# Patient Record
Sex: MALE | Race: WHITE | NOT HISPANIC OR LATINO | Employment: OTHER | URBAN - METROPOLITAN AREA
[De-identification: names, ages, dates, MRNs, and addresses within clinical notes are randomized per-mention and may not be internally consistent; named-entity substitution may affect disease eponyms.]

---

## 2017-01-25 ENCOUNTER — OFFICE VISIT - HEALTHEAST (OUTPATIENT)
Dept: FAMILY MEDICINE | Facility: CLINIC | Age: 67
End: 2017-01-25

## 2017-01-25 DIAGNOSIS — Z00.00 HEALTHCARE MAINTENANCE: ICD-10-CM

## 2017-01-25 DIAGNOSIS — M54.12 CERVICAL RADICULOPATHY AT C6: ICD-10-CM

## 2017-01-25 LAB
CREAT SERPL-MCNC: 0.8 MG/DL (ref 0.7–1.3)
GFR SERPL CREATININE-BSD FRML MDRD: >60 ML/MIN/1.73M2

## 2017-01-25 ASSESSMENT — MIFFLIN-ST. JEOR: SCORE: 1297.42

## 2017-01-30 ENCOUNTER — RECORDS - HEALTHEAST (OUTPATIENT)
Dept: ADMINISTRATIVE | Facility: OTHER | Age: 67
End: 2017-01-30

## 2017-02-02 ENCOUNTER — HOSPITAL ENCOUNTER (OUTPATIENT)
Dept: PHYSICAL MEDICINE AND REHAB | Facility: CLINIC | Age: 67
Discharge: HOME | End: 2017-02-02
Attending: FAMILY MEDICINE

## 2017-02-02 DIAGNOSIS — M54.12 CERVICAL RADICULAR PAIN: ICD-10-CM

## 2017-02-02 DIAGNOSIS — M48.02 NEURAL FORAMINAL STENOSIS OF CERVICAL SPINE: ICD-10-CM

## 2017-02-02 DIAGNOSIS — M79.18 MYOFASCIAL PAIN: ICD-10-CM

## 2017-02-02 ASSESSMENT — MIFFLIN-ST. JEOR: SCORE: 1292.89

## 2017-02-06 ENCOUNTER — HOSPITAL ENCOUNTER (OUTPATIENT)
Dept: PHYSICAL MEDICINE AND REHAB | Facility: CLINIC | Age: 67
Discharge: HOME | End: 2017-02-06
Attending: PHYSICAL MEDICINE & REHABILITATION

## 2017-02-06 DIAGNOSIS — M54.12 CERVICAL RADICULAR PAIN: ICD-10-CM

## 2017-02-06 DIAGNOSIS — M48.02 NEURAL FORAMINAL STENOSIS OF CERVICAL SPINE: ICD-10-CM

## 2017-02-06 DIAGNOSIS — M99.81 OTHER BIOMECHANICAL LESIONS OF CERVICAL REGION: ICD-10-CM

## 2017-02-20 ENCOUNTER — HOSPITAL ENCOUNTER (OUTPATIENT)
Dept: PHYSICAL MEDICINE AND REHAB | Facility: CLINIC | Age: 67
Discharge: HOME | End: 2017-02-20
Attending: PHYSICAL MEDICINE & REHABILITATION

## 2017-02-20 DIAGNOSIS — M54.12 CERVICAL RADICULAR PAIN: ICD-10-CM

## 2017-02-20 DIAGNOSIS — M79.18 MYOFASCIAL PAIN: ICD-10-CM

## 2017-02-20 DIAGNOSIS — M48.02 NEURAL FORAMINAL STENOSIS OF CERVICAL SPINE: ICD-10-CM

## 2017-02-20 ASSESSMENT — MIFFLIN-ST. JEOR: SCORE: 1292.89

## 2017-02-21 ENCOUNTER — OFFICE VISIT - HEALTHEAST (OUTPATIENT)
Dept: PHYSICAL THERAPY | Facility: REHABILITATION | Age: 67
End: 2017-02-21

## 2017-02-21 DIAGNOSIS — M54.12 CERVICAL RADICULOPATHY: ICD-10-CM

## 2017-02-21 DIAGNOSIS — R29.3 POOR POSTURE: ICD-10-CM

## 2017-02-21 DIAGNOSIS — R29.898 DECREASED ROM OF NECK: ICD-10-CM

## 2017-02-21 DIAGNOSIS — M62.81 GENERALIZED MUSCLE WEAKNESS: ICD-10-CM

## 2017-02-23 ENCOUNTER — OFFICE VISIT - HEALTHEAST (OUTPATIENT)
Dept: PHYSICAL THERAPY | Facility: REHABILITATION | Age: 67
End: 2017-02-23

## 2017-02-23 DIAGNOSIS — R29.898 DECREASED ROM OF NECK: ICD-10-CM

## 2017-02-23 DIAGNOSIS — R29.3 POOR POSTURE: ICD-10-CM

## 2017-02-23 DIAGNOSIS — M54.2 ACUTE NECK PAIN: ICD-10-CM

## 2017-02-23 DIAGNOSIS — M62.81 GENERALIZED MUSCLE WEAKNESS: ICD-10-CM

## 2017-02-23 DIAGNOSIS — M54.12 CERVICAL RADICULOPATHY: ICD-10-CM

## 2017-02-28 ENCOUNTER — OFFICE VISIT - HEALTHEAST (OUTPATIENT)
Dept: PHYSICAL THERAPY | Facility: REHABILITATION | Age: 67
End: 2017-02-28

## 2017-02-28 DIAGNOSIS — M54.12 CERVICAL RADICULOPATHY: ICD-10-CM

## 2017-02-28 DIAGNOSIS — R29.3 POOR POSTURE: ICD-10-CM

## 2017-02-28 DIAGNOSIS — M62.81 GENERALIZED MUSCLE WEAKNESS: ICD-10-CM

## 2017-02-28 DIAGNOSIS — R29.898 DECREASED ROM OF NECK: ICD-10-CM

## 2017-03-02 ENCOUNTER — COMMUNICATION - HEALTHEAST (OUTPATIENT)
Dept: FAMILY MEDICINE | Facility: CLINIC | Age: 67
End: 2017-03-02

## 2017-03-02 ENCOUNTER — OFFICE VISIT - HEALTHEAST (OUTPATIENT)
Dept: PHYSICAL THERAPY | Facility: REHABILITATION | Age: 67
End: 2017-03-02

## 2017-03-02 DIAGNOSIS — M62.81 GENERALIZED MUSCLE WEAKNESS: ICD-10-CM

## 2017-03-02 DIAGNOSIS — M54.12 CERVICAL RADICULOPATHY: ICD-10-CM

## 2017-03-02 DIAGNOSIS — R29.898 DECREASED ROM OF NECK: ICD-10-CM

## 2017-03-02 DIAGNOSIS — N52.9 ERECTILE DYSFUNCTION: ICD-10-CM

## 2017-03-02 DIAGNOSIS — R29.3 POOR POSTURE: ICD-10-CM

## 2017-03-14 ENCOUNTER — OFFICE VISIT - HEALTHEAST (OUTPATIENT)
Dept: PHYSICAL THERAPY | Facility: REHABILITATION | Age: 67
End: 2017-03-14

## 2017-03-14 DIAGNOSIS — M62.81 GENERALIZED MUSCLE WEAKNESS: ICD-10-CM

## 2017-03-14 DIAGNOSIS — M54.2 ACUTE NECK PAIN: ICD-10-CM

## 2017-03-14 DIAGNOSIS — R29.3 POOR POSTURE: ICD-10-CM

## 2017-03-14 DIAGNOSIS — M54.12 CERVICAL RADICULOPATHY: ICD-10-CM

## 2017-03-14 DIAGNOSIS — R29.898 DECREASED ROM OF NECK: ICD-10-CM

## 2017-09-25 ENCOUNTER — RECORDS - HEALTHEAST (OUTPATIENT)
Dept: ADMINISTRATIVE | Facility: OTHER | Age: 67
End: 2017-09-25

## 2018-04-26 ENCOUNTER — COMMUNICATION - HEALTHEAST (OUTPATIENT)
Dept: FAMILY MEDICINE | Facility: CLINIC | Age: 68
End: 2018-04-26

## 2018-04-26 ENCOUNTER — OFFICE VISIT - HEALTHEAST (OUTPATIENT)
Dept: FAMILY MEDICINE | Facility: CLINIC | Age: 68
End: 2018-04-26

## 2018-04-26 ENCOUNTER — COMMUNICATION - HEALTHEAST (OUTPATIENT)
Dept: TELEHEALTH | Facility: CLINIC | Age: 68
End: 2018-04-26

## 2018-04-26 DIAGNOSIS — D12.6 ADENOMATOUS POLYP OF COLON: ICD-10-CM

## 2018-04-26 DIAGNOSIS — N52.9 ERECTILE DYSFUNCTION: ICD-10-CM

## 2018-04-26 DIAGNOSIS — K40.20 NON-RECURRENT BILATERAL INGUINAL HERNIA WITHOUT OBSTRUCTION OR GANGRENE: ICD-10-CM

## 2018-04-26 DIAGNOSIS — J45.20 MILD INTERMITTENT ASTHMA: ICD-10-CM

## 2018-04-26 DIAGNOSIS — Z00.00 ROUTINE GENERAL MEDICAL EXAMINATION AT A HEALTH CARE FACILITY: ICD-10-CM

## 2018-04-26 DIAGNOSIS — N40.0 BPH (BENIGN PROSTATIC HYPERPLASIA): ICD-10-CM

## 2018-04-26 DIAGNOSIS — L57.0 ACTINIC KERATOSES: ICD-10-CM

## 2018-04-26 DIAGNOSIS — Z00.00 HEALTHCARE MAINTENANCE: ICD-10-CM

## 2018-04-26 LAB
CHOLEST SERPL-MCNC: 185 MG/DL
FASTING STATUS PATIENT QL REPORTED: YES
FASTING STATUS PATIENT QL REPORTED: YES
GLUCOSE BLD-MCNC: 91 MG/DL (ref 70–99)
HDLC SERPL-MCNC: 78 MG/DL
LDLC SERPL CALC-MCNC: 97 MG/DL
TRIGL SERPL-MCNC: 50 MG/DL

## 2018-04-26 ASSESSMENT — MIFFLIN-ST. JEOR: SCORE: 1296.58

## 2018-04-26 NOTE — ASSESSMENT & PLAN NOTE
Asthma is currently well controlled  ACT= 23  Comment: Uses controller medicine, Flovent, only during allergy season, 1 puff twice daily    Current medications  Rescue: Albuterol:   Primary Controller: Flovent 44  Long Acting Bronchodilator?  No  Other meds?  No    Pneumovax/ PCV 13 UTD?  Given  Consider Flu vaccine if relevant: Gets this every year    Plan: No change

## 2018-04-26 NOTE — ASSESSMENT & PLAN NOTE
Right hemiscrotum with probable varicocele plus laxity of the inguinal ring, likely hernia, some laxity in the left side 2, less.  Discussed options, elected to follow-up with surgeon for consultation

## 2018-04-26 NOTE — ASSESSMENT & PLAN NOTE
Healthcare maintenance assessment  Immunization- PCV 13 given, up-to-date  Cancer screening-discussion and declined PSA, up-to-date colon cancer screening  Vascular-blood pressure good, exercises regularly, lipid recheck  Other-none

## 2018-04-26 NOTE — ASSESSMENT & PLAN NOTE
Actinic Keratosis treatment    History noted on exam today, no complaints from patient    5Keratosis(s) were treated with liquid nitrogen.    Location(s): Both temples and left forehead  Appearance small erythematous and scaly  Cotton tipped applicator used to make frost ring on AKs and this wasmaintained for 1 minute   Was patient asked to follow up for another treatment in 2-3 weeks?  No

## 2018-04-27 ENCOUNTER — OFFICE VISIT - HEALTHEAST (OUTPATIENT)
Dept: SURGERY | Facility: CLINIC | Age: 68
End: 2018-04-27

## 2018-04-27 DIAGNOSIS — K40.20 NON-RECURRENT BILATERAL INGUINAL HERNIA WITHOUT OBSTRUCTION OR GANGRENE: ICD-10-CM

## 2018-04-27 ASSESSMENT — MIFFLIN-ST. JEOR: SCORE: 1295.16

## 2018-05-03 ENCOUNTER — COMMUNICATION - HEALTHEAST (OUTPATIENT)
Dept: SURGERY | Facility: CLINIC | Age: 68
End: 2018-05-03

## 2018-05-24 ENCOUNTER — RECORDS - HEALTHEAST (OUTPATIENT)
Dept: ADMINISTRATIVE | Facility: OTHER | Age: 68
End: 2018-05-24

## 2018-08-15 ENCOUNTER — RECORDS - HEALTHEAST (OUTPATIENT)
Dept: ADMINISTRATIVE | Facility: OTHER | Age: 68
End: 2018-08-15

## 2018-10-09 ENCOUNTER — RECORDS - HEALTHEAST (OUTPATIENT)
Dept: ADMINISTRATIVE | Facility: OTHER | Age: 68
End: 2018-10-09

## 2019-02-19 ENCOUNTER — OFFICE VISIT - HEALTHEAST (OUTPATIENT)
Dept: FAMILY MEDICINE | Facility: CLINIC | Age: 69
End: 2019-02-19

## 2019-02-19 DIAGNOSIS — L01.00 IMPETIGO: ICD-10-CM

## 2019-11-05 ENCOUNTER — COMMUNICATION - HEALTHEAST (OUTPATIENT)
Dept: FAMILY MEDICINE | Facility: CLINIC | Age: 69
End: 2019-11-05

## 2019-11-05 ENCOUNTER — AMBULATORY - HEALTHEAST (OUTPATIENT)
Dept: FAMILY MEDICINE | Facility: CLINIC | Age: 69
End: 2019-11-05

## 2019-11-05 DIAGNOSIS — N52.9 ERECTILE DYSFUNCTION, UNSPECIFIED ERECTILE DYSFUNCTION TYPE: ICD-10-CM

## 2019-11-06 ENCOUNTER — AMBULATORY - HEALTHEAST (OUTPATIENT)
Dept: NURSING | Facility: CLINIC | Age: 69
End: 2019-11-06

## 2019-11-06 DIAGNOSIS — Z23 FLU VACCINE NEED: ICD-10-CM

## 2019-11-16 ENCOUNTER — OFFICE VISIT - HEALTHEAST (OUTPATIENT)
Dept: FAMILY MEDICINE | Facility: CLINIC | Age: 69
End: 2019-11-16

## 2019-11-16 DIAGNOSIS — J45.21 MILD INTERMITTENT ASTHMA WITH ACUTE EXACERBATION: ICD-10-CM

## 2019-11-16 DIAGNOSIS — J06.9 VIRAL URI: ICD-10-CM

## 2019-11-18 ENCOUNTER — COMMUNICATION - HEALTHEAST (OUTPATIENT)
Dept: FAMILY MEDICINE | Facility: CLINIC | Age: 69
End: 2019-11-18

## 2020-01-06 ENCOUNTER — OFFICE VISIT - HEALTHEAST (OUTPATIENT)
Dept: SURGERY | Facility: CLINIC | Age: 70
End: 2020-01-06

## 2020-01-06 ENCOUNTER — SURGERY - HEALTHEAST (OUTPATIENT)
Dept: SURGERY | Facility: CLINIC | Age: 70
End: 2020-01-06

## 2020-01-06 DIAGNOSIS — K40.20 BILATERAL INGUINAL HERNIA: ICD-10-CM

## 2020-01-06 DIAGNOSIS — K40.20 NON-RECURRENT BILATERAL INGUINAL HERNIA WITHOUT OBSTRUCTION OR GANGRENE: ICD-10-CM

## 2020-01-06 RX ORDER — MULTIVIT WITH MINERALS/LUTEIN
1000 TABLET ORAL DAILY
Status: SHIPPED | COMMUNITY
Start: 2020-01-06

## 2020-01-06 RX ORDER — MULTIPLE VITAMINS W/ MINERALS TAB 9MG-400MCG
1 TAB ORAL DAILY
Status: SHIPPED | COMMUNITY
Start: 2020-01-06

## 2020-01-21 ENCOUNTER — OFFICE VISIT - HEALTHEAST (OUTPATIENT)
Dept: FAMILY MEDICINE | Facility: CLINIC | Age: 70
End: 2020-01-21

## 2020-01-21 DIAGNOSIS — Z01.818 PRE-OP EXAM: ICD-10-CM

## 2020-01-21 DIAGNOSIS — K40.20 NON-RECURRENT BILATERAL INGUINAL HERNIA WITHOUT OBSTRUCTION OR GANGRENE: ICD-10-CM

## 2020-01-21 DIAGNOSIS — J45.20 MILD INTERMITTENT ASTHMA WITHOUT COMPLICATION: ICD-10-CM

## 2020-01-21 LAB
ANION GAP SERPL CALCULATED.3IONS-SCNC: 10 MMOL/L (ref 5–18)
ATRIAL RATE - MUSE: 59 BPM
BUN SERPL-MCNC: 15 MG/DL (ref 8–22)
CALCIUM SERPL-MCNC: 9.3 MG/DL (ref 8.5–10.5)
CHLORIDE BLD-SCNC: 102 MMOL/L (ref 98–107)
CO2 SERPL-SCNC: 28 MMOL/L (ref 22–31)
CREAT SERPL-MCNC: 0.82 MG/DL (ref 0.7–1.3)
DIASTOLIC BLOOD PRESSURE - MUSE: NORMAL
ERYTHROCYTE [DISTWIDTH] IN BLOOD BY AUTOMATED COUNT: 11.6 % (ref 11–14.5)
GFR SERPL CREATININE-BSD FRML MDRD: >60 ML/MIN/1.73M2
GLUCOSE BLD-MCNC: 82 MG/DL (ref 70–125)
HCT VFR BLD AUTO: 45 % (ref 40–54)
HGB BLD-MCNC: 14.8 G/DL (ref 14–18)
INTERPRETATION ECG - MUSE: NORMAL
MCH RBC QN AUTO: 31.5 PG (ref 27–34)
MCHC RBC AUTO-ENTMCNC: 32.8 G/DL (ref 32–36)
MCV RBC AUTO: 96 FL (ref 80–100)
P AXIS - MUSE: 81 DEGREES
PLATELET # BLD AUTO: 208 THOU/UL (ref 140–440)
PMV BLD AUTO: 9.1 FL (ref 7–10)
POTASSIUM BLD-SCNC: 4.5 MMOL/L (ref 3.5–5)
PR INTERVAL - MUSE: 152 MS
QRS DURATION - MUSE: 90 MS
QT - MUSE: 414 MS
QTC - MUSE: 409 MS
R AXIS - MUSE: 67 DEGREES
RBC # BLD AUTO: 4.68 MILL/UL (ref 4.4–6.2)
SODIUM SERPL-SCNC: 140 MMOL/L (ref 136–145)
SYSTOLIC BLOOD PRESSURE - MUSE: NORMAL
T AXIS - MUSE: 61 DEGREES
VENTRICULAR RATE- MUSE: 59 BPM
WBC: 3.9 THOU/UL (ref 4–11)

## 2020-01-21 ASSESSMENT — MIFFLIN-ST. JEOR: SCORE: 1263.41

## 2020-01-22 ASSESSMENT — MIFFLIN-ST. JEOR: SCORE: 1275.88

## 2020-01-23 ENCOUNTER — ANESTHESIA - HEALTHEAST (OUTPATIENT)
Dept: SURGERY | Facility: AMBULATORY SURGERY CENTER | Age: 70
End: 2020-01-23

## 2020-01-24 ENCOUNTER — SURGERY - HEALTHEAST (OUTPATIENT)
Dept: SURGERY | Facility: AMBULATORY SURGERY CENTER | Age: 70
End: 2020-01-24

## 2020-01-24 ASSESSMENT — MIFFLIN-ST. JEOR: SCORE: 1275.88

## 2020-04-29 ENCOUNTER — COMMUNICATION - HEALTHEAST (OUTPATIENT)
Dept: SCHEDULING | Facility: CLINIC | Age: 70
End: 2020-04-29

## 2020-04-29 ENCOUNTER — OFFICE VISIT - HEALTHEAST (OUTPATIENT)
Dept: FAMILY MEDICINE | Facility: CLINIC | Age: 70
End: 2020-04-29

## 2020-04-29 DIAGNOSIS — T14.8XXA BONE BRUISE: ICD-10-CM

## 2020-04-29 ASSESSMENT — MIFFLIN-ST. JEOR: SCORE: 1283.13

## 2020-08-10 ENCOUNTER — OFFICE VISIT - HEALTHEAST (OUTPATIENT)
Dept: FAMILY MEDICINE | Facility: CLINIC | Age: 70
End: 2020-08-10

## 2020-08-10 DIAGNOSIS — H61.22 IMPACTED CERUMEN OF LEFT EAR: ICD-10-CM

## 2020-08-12 ENCOUNTER — COMMUNICATION - HEALTHEAST (OUTPATIENT)
Dept: FAMILY MEDICINE | Facility: CLINIC | Age: 70
End: 2020-08-12

## 2021-05-17 ENCOUNTER — COMMUNICATION - HEALTHEAST (OUTPATIENT)
Dept: FAMILY MEDICINE | Facility: CLINIC | Age: 71
End: 2021-05-17

## 2021-05-17 DIAGNOSIS — N52.9 ERECTILE DYSFUNCTION, UNSPECIFIED ERECTILE DYSFUNCTION TYPE: ICD-10-CM

## 2021-05-18 RX ORDER — SILDENAFIL 25 MG/1
25 TABLET, FILM COATED ORAL DAILY PRN
Qty: 6 TABLET | Refills: 9 | Status: SHIPPED | OUTPATIENT
Start: 2021-05-18 | End: 2023-01-12

## 2021-05-26 ENCOUNTER — RECORDS - HEALTHEAST (OUTPATIENT)
Dept: ADMINISTRATIVE | Facility: CLINIC | Age: 71
End: 2021-05-26

## 2021-05-27 VITALS
SYSTOLIC BLOOD PRESSURE: 135 MMHG | DIASTOLIC BLOOD PRESSURE: 83 MMHG | RESPIRATION RATE: 18 BRPM | TEMPERATURE: 97.7 F | HEART RATE: 60 BPM | OXYGEN SATURATION: 100 %

## 2021-05-28 ENCOUNTER — RECORDS - HEALTHEAST (OUTPATIENT)
Dept: ADMINISTRATIVE | Facility: CLINIC | Age: 71
End: 2021-05-28

## 2021-05-28 ASSESSMENT — ASTHMA QUESTIONNAIRES: ACT_TOTALSCORE: 24

## 2021-05-30 VITALS — HEIGHT: 68 IN | BODY MASS INDEX: 18.64 KG/M2 | WEIGHT: 123 LBS

## 2021-05-30 VITALS — BODY MASS INDEX: 18.49 KG/M2 | WEIGHT: 122 LBS | HEIGHT: 68 IN

## 2021-05-30 VITALS — WEIGHT: 122 LBS | HEIGHT: 68 IN | BODY MASS INDEX: 18.49 KG/M2

## 2021-06-01 VITALS — BODY MASS INDEX: 19.78 KG/M2 | WEIGHT: 126 LBS | HEIGHT: 67 IN

## 2021-06-01 VITALS — WEIGHT: 126.31 LBS | BODY MASS INDEX: 19.82 KG/M2 | HEIGHT: 67 IN

## 2021-06-02 VITALS — BODY MASS INDEX: 20.36 KG/M2 | WEIGHT: 130 LBS

## 2021-06-03 NOTE — PATIENT INSTRUCTIONS - HE
You were seen today for a cough. This is likely due to a virus and will improve over the next 1-2 weeks on its own.    Symptom management:  - Drink plenty of non-caffeinated fluids  - Avoid smoke exposure  - May use tylenol or ibuprofen for discomfort  - Drink a warm non-caffeinated tea with honey  - Place a warm humidifier in your bedroom at night  - Lester's VaporRub  - If prescribed, use Tessalon Perles to help suppress the cough    Reasons to return for re-evaluation:  - Develop a fever 100.4 or higher, current fever worsens, or fever does not improve in 72 hours  - Difficulty breathing or shortness of breath  - Cough continues to worsen including coughing up blood or coughing up thick, colored phlegm  - Unable to tolerate fluids    Otherwise, if symptoms have not improved in 7 days, follow-up with your primary care provider.

## 2021-06-03 NOTE — TELEPHONE ENCOUNTER
Patient was at the [facility] clinic 11/16/2019 forwarding to correct location.

## 2021-06-03 NOTE — TELEPHONE ENCOUNTER
"Medication Request  Medication name:   Sildenafil 25 mg, 8 Tablets  Pharmacy Name and Location:   [facility] in [facility], #[id number]  Reason for request:   ED  When did you use medication last?:    Within the last month.  Patient offered appointment:  No, patient states \"Dr. Nunez just orders it for me.\"  Okay to leave a detailed message: yes    "

## 2021-06-03 NOTE — PROGRESS NOTES
Assessment:       1. Viral URI     2. Mild intermittent asthma with acute exacerbation       Medical Decision Making  Presents with cough for 1 week most consistent with a viral upper respiratory infection.  No concern for pneumonia at this time given normal lung auscultation on exam, no shortness of breath, and good oxygen saturation at 90% on room air.  No signs of asthma exacerbation given no wheezing on exam today.  Patient also notes good improvement in wheezing with his at home albuterol inhaler.      Plan:       Recommended honey and Vicks VapoRub as needed for cough.  Continue to drink plenty of fluids and allow for appropriate rest.  Continue to use at home albuterol inhaler as needed.  Discussed signs of worsening symptoms and when to follow-up with PCP if no symptom improvement.      Patient Instructions   You were seen today for a cough. This is likely due to a virus and will improve over the next 1-2 weeks on its own.    Symptom management:  - Drink plenty of non-caffeinated fluids  - Avoid smoke exposure  - May use tylenol or ibuprofen for discomfort  - Drink a warm non-caffeinated tea with honey  - Place a warm humidifier in your bedroom at night  - Lester's VaporRub  - If prescribed, use Tessalon Perles to help suppress the cough    Reasons to return for re-evaluation:  - Develop a fever 100.4 or higher, current fever worsens, or fever does not improve in 72 hours  - Difficulty breathing or shortness of breath  - Cough continues to worsen including coughing up blood or coughing up thick, colored phlegm  - Unable to tolerate fluids    Otherwise, if symptoms have not improved in 7 days, follow-up with your primary care provider.      Subjective:       Terell Sebastian is a 69 y.o. male here for evaluation of.  Onset of symptoms was 1 week ago.  Patient initially noticed a tickle in the throat soon followed by a cough with occasional phlegm production.  Patient initially had subjective fevers, fatigue,  body aches that is since improved.  He states last night the cough worsened he had difficulties sleeping and became more wet sounding.  Patient does have a history of mild intermittent asthma.  He used his albuterol inhaler as needed with good relief.    The following portions of the patient's history were reviewed and updated as appropriate: allergies, current medications and problem list.    Review of Systems  Pertinent items are noted in HPI.     Allergies  No Known Allergies    Family History   Problem Relation Age of Onset     Alzheimer's disease Unknown         Paternal     Breast cancer Unknown         Paternal      Heart disease Father      Asthma Sister      Breast cancer Sister      Asthma Maternal Aunt      Pneumonia Mother        Social History     Socioeconomic History     Marital status: Single     Spouse name: None     Number of children: None     Years of education: None     Highest education level: None   Occupational History     None   Social Needs     Financial resource strain: None     Food insecurity:     Worry: None     Inability: None     Transportation needs:     Medical: None     Non-medical: None   Tobacco Use     Smoking status: Never Smoker     Smokeless tobacco: Never Used   Substance and Sexual Activity     Alcohol use: Yes     Drug use: No     Sexual activity: None   Lifestyle     Physical activity:     Days per week: None     Minutes per session: None     Stress: None   Relationships     Social connections:     Talks on phone: None     Gets together: None     Attends Tenriism service: None     Active member of club or organization: None     Attends meetings of clubs or organizations: None     Relationship status: None     Intimate partner violence:     Fear of current or ex partner: None     Emotionally abused: None     Physically abused: None     Forced sexual activity: None   Other Topics Concern     None   Social History Narrative    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          Objective:       /80   Pulse 77   Temp 98  F (36.7  C) (Oral)   Resp 12   Wt 124 lb 4.8 oz (56.4 kg)   SpO2 98%   BMI 19.47 kg/m    General appearance: alert, appears stated age, cooperative, no distress and non-toxic  Head: Normocephalic, without obvious abnormality, atraumatic, sinuses nontender to percussion  Ears: normal TM's and external ear canals both ears  Nose: no discharge  Throat: lips, mucosa, and tongue normal; teeth and gums normal  Neck: no adenopathy and supple, symmetrical, trachea midline  Lungs: clear to auscultation bilaterally and No rhonchi, rales, or wheezing  Heart: regular rate and rhythm, S1, S2 normal, no murmur, click, rub or gallop

## 2021-06-03 NOTE — TELEPHONE ENCOUNTER
Who is calling:  Patient   Reason for Call:  Patient states when he came for office visit on 11/16/19 he forgot his [employer] electronic reader the case is light brown color if clinic staff finds please call patient cell phone number [phone] and inform.   Date of last appointment with primary care: 311/16/19  Okay to leave a detailed message: No

## 2021-06-03 NOTE — TELEPHONE ENCOUNTER
Called and informed pt that the nook is not in clinic      Pt will keep looking - wasn't sure where he left it

## 2021-06-04 VITALS — BODY MASS INDEX: 18.19 KG/M2 | HEIGHT: 68 IN | WEIGHT: 120 LBS

## 2021-06-04 VITALS
WEIGHT: 123.8 LBS | OXYGEN SATURATION: 99 % | SYSTOLIC BLOOD PRESSURE: 120 MMHG | BODY MASS INDEX: 19.39 KG/M2 | DIASTOLIC BLOOD PRESSURE: 62 MMHG | HEART RATE: 62 BPM

## 2021-06-04 VITALS
WEIGHT: 121.6 LBS | RESPIRATION RATE: 16 BRPM | DIASTOLIC BLOOD PRESSURE: 82 MMHG | HEIGHT: 68 IN | SYSTOLIC BLOOD PRESSURE: 158 MMHG | TEMPERATURE: 98.5 F | BODY MASS INDEX: 18.43 KG/M2 | HEART RATE: 65 BPM | OXYGEN SATURATION: 99 %

## 2021-06-04 VITALS
RESPIRATION RATE: 20 BRPM | WEIGHT: 119 LBS | BODY MASS INDEX: 18.68 KG/M2 | SYSTOLIC BLOOD PRESSURE: 118 MMHG | HEART RATE: 70 BPM | HEIGHT: 67 IN | DIASTOLIC BLOOD PRESSURE: 68 MMHG

## 2021-06-04 VITALS
HEART RATE: 77 BPM | OXYGEN SATURATION: 98 % | BODY MASS INDEX: 19.47 KG/M2 | TEMPERATURE: 98 F | WEIGHT: 124.3 LBS | DIASTOLIC BLOOD PRESSURE: 80 MMHG | SYSTOLIC BLOOD PRESSURE: 133 MMHG | RESPIRATION RATE: 12 BRPM

## 2021-06-04 NOTE — PROGRESS NOTES
HPI:  This is a 69 y.o. male here today with concerns of pain and bulging in his  bilateral groins area. He has noted this for the past few year(s). The symptoms have progressed and increased over this time. He comes in for evaluation secondary to the hernia causing enough issues to bother them with daily activities or chores.    Allergies:Patient has no known allergies.    History reviewed. No pertinent past medical history.    Past Surgical History:   Procedure Laterality Date     NO PAST SURGERIES         CURRENT MEDS:      Family History   Problem Relation Age of Onset     Alzheimer's disease Other         Paternal     Breast cancer Other         Paternal      Heart disease Father      Asthma Sister      Breast cancer Sister      Asthma Maternal Aunt      Pneumonia Mother         reports that he has never smoked. He has never used smokeless tobacco. He reports current alcohol use. He reports that he does not use drugs.    Review of Systems - Negative except bilateral groin bulges and discomfort and pain. Otherwise twelve system of review is negative.      Vitals:    01/06/20 0859   BP: 120/62   Patient Site: Right Arm   Patient Position: Sitting   Cuff Size: Adult Regular   Pulse: 62   SpO2: 99%   Weight: 123 lb 12.8 oz (56.2 kg)       Body mass index is 19.39 kg/m .    EXAM:  General: NAD   HEENT: normocephalic, PERRLA and EOMS intact  Mounth: Mucus membranes moist  Neck: Supple  Chest: Clear to auscultation bilaterally  CV: RRR  ABD: Soft nontender and nondistended, bilateral inguinal hernia, reducible  EXT: Warm, pulses intact,   Neuro: Alert and oriented x3  Back: no CVA tenderness      Assessment/Plan: Pt with a bilateral inguinal hernia. I discussed this at length with He.  I went over conservative management as well as surgical treatment of this.. I would plan on doing this via anlaparoscopic  approach with possible use of mesh. I went over the small risks of surgery including but not limited to bleeding  and infection, anesthesia, recurrence rates and nerve injury. I discussed the expected recovery time as well. Will get this scheduled. He will contact us to have this scheduled.      Ricardo Valerio MD  [facility] Department of Surgery

## 2021-06-05 NOTE — ANESTHESIA CARE TRANSFER NOTE
Last vitals:   Vitals:    01/24/20 0859   BP: (P) 137/65   Pulse: (P) 71   Resp: (P) 16   Temp: (P) 36.4  C (97.6  F)   SpO2: (P) 100%     Pt brought to PACU on 10L facemask. Monitors applied. VSS upon arrival.    Patient's level of consciousness is drowsy  Spontaneous respirations: yes  Maintains airway independently: yes  Dentition unchanged: yes  Oropharynx: oropharynx clear of all foreign objects    QCDR Measures:  ASA# 20 - Surgical Safety Checklist: WHO surgical safety checklist completed prior to induction    PQRS# 430 - Adult PONV Prevention: 4558F - Pt received => 2 anti-emetic agents (different classes) preop & intraop  ASA# 8 - Peds PONV Prevention: NA - Not pediatric patient, not GA or 2 or more risk factors NOT present  PQRS# 424 - Olga-op Temp Management: 4559F - At least one body temp DOCUMENTED => 35.5C or 95.9F within required timeframe  PQRS# 426 - PACU Transfer Protocol: - Transfer of care checklist used  ASA# 14 - Acute Post-op Pain: ASA14B - Patient did NOT experience pain >= 7 out of 10

## 2021-06-05 NOTE — ANESTHESIA PREPROCEDURE EVALUATION
Anesthesia Evaluation      Patient summary reviewed   No history of anesthetic complications     Airway   Mallampati: II   Pulmonary - normal exam   (+) asthma  mild,                          Cardiovascular - negative ROS and normal exam  Rhythm: regular  Rate: normal,         Neuro/Psych - negative ROS     Endo/Other - negative ROS      GI/Hepatic/Renal - negative ROS           Dental - normal exam                        Anesthesia Plan  Planned anesthetic: general endotracheal  GAETT  Scopolamine for PONV  Antiemetics  Tylenol po pre op  Toradol at the end of case if okay with surgeon  Consider background propofol  Soft bite block  ASA 1   Induction: intravenous   Anesthetic plan and risks discussed with: patient    Post-op plan: routine recovery

## 2021-06-05 NOTE — ANESTHESIA POSTPROCEDURE EVALUATION
Patient: Terell Sebastian  Procedure(s):  Laparoscopic bilateral inguinal hernia repair (Bilateral)  Anesthesia type: general    Patient location: Phase II Recovery  Last vitals:   Vitals Value Taken Time   /80 1/24/2020 10:30 AM   Temp 36.3  C (97.4  F) 1/24/2020  9:23 AM   Pulse 67 1/24/2020 10:37 AM   Resp 16 1/24/2020  9:23 AM   SpO2 98 % 1/24/2020 10:37 AM   Vitals shown include unvalidated device data.  Post vital signs: stable  Level of consciousness: awake and responds to simple questions  Post-anesthesia pain: pain controlled  Post-anesthesia nausea and vomiting: no  Pulmonary: unassisted, return to baseline  Cardiovascular: stable and blood pressure at baseline  Hydration: adequate  Anesthetic events: no    QCDR Measures:  ASA# 11 - Olga-op Cardiac Arrest: ASA11B - Patient did NOT experience unanticipated cardiac arrest  ASA# 12 - Olga-op Mortality Rate: ASA12B - Patient did NOT die  ASA# 13 - PACU Re-Intubation Rate: ASA13B - Patient did NOT require a new airway mgmt  ASA# 10 - Composite Anes Safety: ASA10A - No serious adverse event    Additional Notes:

## 2021-06-05 NOTE — PROGRESS NOTES
Assessment/Plan:      Visit for Preoperative Exam.   1. Pre-op exam  2. Non-recurrent bilateral inguinal hernia without obstruction or gangrene  3. Mild intermittent asthma without complication  - Basic Metabolic Panel  - HM2(CBC w/o Differential)  - Electrocardiogram Perform and Read    Past medical history, problem list, past surgical history, family history, social history, medications reviewed, updated, reconciled.   Well appearing.   Asthma well controlled.   No concerns today.   Patient approved for surgery with general or local anesthesia. Postoperative Care will be managed by Hospital Service. Labs will be done as indicated. Above recommendations were reviewed with the patient. No Cardiology consultation or non-invasive testing.     Subjective:     Scheduled Procedure: hernia repair  Surgery Date:  1/24/20   HPI  [age] year old male here for pre op evaluation.   No concerns.   History of well controlled asthma.   Is feeling well. No recent illness. No prior surgery. Influenza vaccine up date.   Current Outpatient Medications   Medication Sig Dispense Refill     albuterol (PROVENTIL HFA;VENTOLIN HFA) 90 mcg/actuation inhaler Inhale 2 puffs every 6 (six) hours as needed for wheezing.       fluticasone (FLOVENT HFA) 44 mcg/actuation inhaler Inhale 1 puff 2 (two) times a day.       multivitamin with minerals (THERA-M) 9 mg iron-400 mcg Tab tablet Take 1 tablet by mouth daily.       sildenafil (VIAGRA) 25 MG tablet Take 1 tablet (25 mg total) by mouth daily as needed for erectile dysfunction. 8 tablet 6     vitamin E 1000 UNIT capsule Take 1,000 Units by mouth daily.       HYDROcodone-acetaminophen 5-325 mg per tablet Take 1-2 tablets by mouth every 6 (six) hours as needed for pain. 15 tablet 0     No current facility-administered medications for this visit.      Facility-Administered Medications Ordered in Other Visits   Medication Dose Route Frequency Provider Last Rate Last Dose     bupivacaine (PF)  "0.25% injection (MARCAINE)    PRN Ricardo Valerio MD   50 mL at 01/24/20 0837     cellulose, oxidized 4x8\" pad (SURGICEL)    PRN Ricardo Valerio MD   1 each at 01/24/20 0832     diphenhydrAMINE injection 12.5 mg (BENADRYL)  12.5 mg Intravenous Q5 Min PRN Bal Lala MD         fentaNYL pf injection 25-50 mcg (SUBLIMAZE)  25-50 mcg Intravenous Q5 Min PRN Bal Lala MD         haloperidol lactate injection 1 mg (HALDOL)  1 mg Intravenous Once PRN Bal Lala MD         HYDROcodone-acetaminophen 5-325 mg per tablet 2 tablet  2 tablet Oral Q4H PRN Bal Lala MD   1 tablet at 01/24/20 1007     lactated Ringers  100 mL/hr Intravenous Continuous PRN Chiquis Saldana MD         meperidine (PF) injection 12.5 mg (DEMEROL)  12.5 mg Intravenous Q5 Min PRN Bal Lala MD         nalbuphine injection 5 mg (NUBAIN)  5 mg Intravenous PRN Bal Lala MD         naloxone injection 0.2-0.4 mg (NARCAN)  0.2-0.4 mg Intravenous PRN Bal Lala MD        Or     naloxone injection 0.2-0.4 mg (NARCAN)  0.2-0.4 mg Intramuscular PRN Bal Lala MD         ondansetron injection 4 mg (ZOFRAN)  4 mg Intravenous Once PRN Bal Lala MD         oxyCODONE-acetaminophen 5-325 mg 2 tablet (PERCOCET/ENDOCET)  2 tablet Oral Q4H PRN Bal Lala MD         scopolamine 1.5 mg transdermal patch 1 patch (TRANSDERM-SCOP)  1 patch Transdermal Once Bal Lala MD   1 patch at 01/24/20 0743     sterile water IR irrigation solution    PRN Ricardo Valerio MD   500 mL at 01/24/20 0823       No Known Allergies    Immunization History   Administered Date(s) Administered     DT (pediatric) 12/09/2004     Influenza [id number], 01/08/2010     Influenza high dose,seasonal,PF, 65+ yrs 09/05/2017, 11/06/2019     Influenza, inj, historic,unspecified 10/26/2007, 10/09/2009, 10/25/2010, 11/18/2011     Influenza, seasonal,quad inj 6-35 mos 10/04/2012     Influenza,seasonal,quad inj =/> 6months " 01/25/2017     Pneumo Conj 13-V (2010&after) 04/26/2018     Pneumo Polysac 23-V 11/18/1997     Td,adult,historic,unspecified 12/09/2004     Tdap 01/25/2017       Patient Active Problem List   Diagnosis     Backache     Allergic Rhinitis     BPH (benign prostatic hyperplasia)     Benign Prostatic Hypertrophy With Urinary Obstruction     Prostatitis     Mild intermittent asthma     Adenomatous polyp of colon     Healthcare maintenance     Non-recurrent bilateral inguinal hernia without obstruction or gangrene     Erectile dysfunction     Actinic keratoses     Bilateral inguinal hernia       Past Medical History:   Diagnosis Date     Asthma      Family History   Problem Relation Age of Onset     Alzheimer's disease Other         Paternal     Breast cancer Other         Paternal      Heart disease Father      Asthma Sister      Breast cancer Sister      Asthma Maternal Aunt      Pneumonia Mother          Social History     Socioeconomic History     Marital status: Single     Spouse name: Not on file     Number of children: Not on file     Years of education: Not on file     Highest education level: Not on file   Occupational History     Not on file   Social Needs     Financial resource strain: Not on file     Food insecurity:     Worry: Not on file     Inability: Not on file     Transportation needs:     Medical: Not on file     Non-medical: Not on file   Tobacco Use     Smoking status: Never Smoker     Smokeless tobacco: Never Used   Substance and Sexual Activity     Alcohol use: Yes     Drug use: No     Sexual activity: Not on file   Lifestyle     Physical activity:     Days per week: Not on file     Minutes per session: Not on file     Stress: Not on file   Relationships     Social connections:     Talks on phone: Not on file     Gets together: Not on file     Attends Lutheran service: Not on file     Active member of club or organization: Not on file     Attends meetings of clubs or organizations: Not on file      "Relationship status: Not on file     Intimate partner violence:     Fear of current or ex partner: Not on file     Emotionally abused: Not on file     Physically abused: Not on file     Forced sexual activity: Not on file   Other Topics Concern     Not on file   Social History Narrative    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 [employer]       Past Surgical History:   Procedure Laterality Date     NO PAST SURGERIES       Recent Health  Fever: no  Chills: no  Fatigue: no  Chest Pain: no  Cough: no  Dyspnea: no  Urinary Frequency: no  Nausea: no  Vomiting: no  Diarrhea: no  Abdominal Pain: no  Easy Bruising: no  Lower Extremity Swelling: no  Poor Exercise Tolerance: no    Most recent Health Maintenance Visit:  1 year(s) ago    Pertinent History  Prior Anesthesia: no  Previous Anesthesia Reaction:  no  Diabetes: no  Cardiovascular Disease: no  Pulmonary Disease: asthma  Renal Disease: no  GI Disease: no  Sleep Apnea: no  Thromboembolic Problems: no  Clotting Disorder: no  Bleeding Disorder: no  Transfusion Reaction: no  Impaired Immunity: no  Steroid use in the last 6 months: no  Frequent Aspirin use: no      Social history of patient does not wear denture or partial plates, there is no transfusion refusal and there are no concerns regarding care after surgery    After surgery, the patient plans to recover at home with family.    Review of Systems  A 12 point comprehensive review of systems was negative except as noted.          Objective:         Vitals:    01/21/20 0827   BP: 118/68   Pulse: 70   Resp: 20   Weight: 119 lb (54 kg)   Height: 5' 7\" (1.702 m)       Physical Exam:  General Appearance: Alert, cooperative, no distress, appears stated age  Head: Normocephalic, without obvious abnormality, atraumatic  Eyes: PERRL, conjunctiva/corneas clear, EOM's intact  Ears: Normal TM's and external ear canals, both ears  Nose: Nares normal, septum midline,mucosa normal, no drainage  Throat: Lips, mucosa, and tongue normal; teeth " and gums normal  Neck: Supple, symmetrical, trachea midline, no adenopathy;  thyroid: not enlarged, symmetric, no tenderness/mass/nodules; no carotid bruit or JVD  Lungs: Clear to auscultation bilaterally, respirations unlabored  Heart: Regular rate and rhythm, S1 and S2 normal, no murmur, rub, or gallop,  Abdomen: Soft, non-tender, bowel sounds active all four quadrants,  no masses, no organomegaly  Genitourinary: defer  Musculoskeletal: Normal range of motion. No joint swelling or deformity.   Extremities: Extremities normal, atraumatic, no cyanosis or edema  Skin: Skin color, texture, turgor normal, no rashes or lesions  Lymph nodes: Cervical, supraclavicular nodes normal  Neurologic: He is alert. He has normal reflexes.   Psychiatric: He has a normal mood and affect.      Recent Results (from the past 240 hour(s))   Electrocardiogram Perform and Read   Result Value Ref Range    SYSTOLIC BLOOD PRESSURE      DIASTOLIC BLOOD PRESSURE      VENTRICULAR RATE 59 BPM    ATRIAL RATE 59 BPM    P-R INTERVAL 152 ms    QRS DURATION 90 ms    Q-T INTERVAL 414 ms    QTC CALCULATION (BEZET) 409 ms    P Axis 81 degrees    R AXIS 67 degrees    T AXIS 61 degrees    MUSE DIAGNOSIS       Sinus bradycardia  Normal ECG  When compared with ECG of 21-MAR-2005 07:04,  No significant change was found  Confirmed by ANGELINA COELHO MD LOC:[facility] ([id number]) on 1/21/2020 4:41:02 PM     Basic Metabolic Panel   Result Value Ref Range    Sodium 140 136 - 145 mmol/L    Potassium 4.5 3.5 - 5.0 mmol/L    Chloride 102 98 - 107 mmol/L    CO2 28 22 - 31 mmol/L    Anion Gap, Calculation 10 5 - 18 mmol/L    Glucose 82 70 - 125 mg/dL    Calcium 9.3 8.5 - 10.5 mg/dL    BUN 15 8 - 22 mg/dL    Creatinine 0.82 0.70 - 1.30 mg/dL    GFR MDRD Af Amer >60 >60 mL/min/1.73m2    GFR MDRD Non Af Amer >60 >60 mL/min/1.73m2   HM2(CBC w/o Differential)   Result Value Ref Range    WBC 3.9 (L) 4.0 - 11.0 thou/uL    RBC 4.68 4.40 - 6.20 mill/uL    Hemoglobin 14.8 14.0 - 18.0 g/dL     Hematocrit 45.0 40.0 - 54.0 %    MCV 96 80 - 100 fL    MCH 31.5 27.0 - 34.0 pg    MCHC 32.8 32.0 - 36.0 g/dL    RDW 11.6 11.0 - 14.5 %    Platelets 208 140 - 440 thou/uL    MPV 9.1 7.0 - 10.0 fL

## 2021-06-07 NOTE — TELEPHONE ENCOUNTER
RN Triage  Linares has been sore since last Thursday. Maybe shin splint? But yesterday started to look like it was swelling. Pain 6/10 with activity.  This morning still looks swollen. Left leg, pain with walking, lower part of the shin on medial side and along side shin bone on lateral side. No obvious redness, mild swelling above ankle.    Given swelling and pain, I advised Terell that he should be evaluated by a physician within 4 hours. He understands this advice and intends to go to walk in clinic or UR.    Emily Vega RN  [facility] Nurse Advisor    COVID 19 Nurse Triage Plan/Patient Instructions    Please be aware that novel coronavirus (COVID-19) may be circulating in the community. If you develop symptoms such as fever, cough, or SOB or if you have concerns about the presence of another infection including coronavirus (COVID-19), please contact your health care provider or visit [email or web address].     Disposition/Instructions    Patient to have an Urgent Care Telephone Visit with a provider. Follow System Ambulatory Workflow for COVID 19.     Urgent Care Telephone Visits are available between the hours of 8 am to 9 pm. Staff will assist patent in scheduling an appointment for this Urgent Care Telephone Visit.     Call Back If: Your symptoms worsen before you are able to complete your Urgent Care Telephone Visit with a provider.     and Patient to go to ED and follow protocol based instructions. Follow System Ambulatory Workflow for COVID 19.     Bring Your Own Device:  Please also bring your smart device(s) (smart phones, tablets, laptops) and their charging cables for your personal use and to communicate with your care team during your visit.      Thank you for limiting contact with others, wearing a simple mask to cover your cough, practice good hand hygiene habits and accessing our virtual services where possible to limit the spread of this virus.    For more information about COVID19 and options for  caring for yourself at home, please visit the CDC website at https://www.cdc.gov/coronavirus/2019-ncov/about/steps-when-sick.html  For more options for care at [facility], please visit our website at [email or web address]    For more information, please use the [place] Department of Health COVID-19 Website: [email or web address]/[email or web address]/index.html  [place] Department of Health ([facility]) COVID-19 Hotlines (Interpreters available):      Health questions: Phone Number: [phone] or 1-180.774.6290 and Hours: 7 a.m. to 7 p.m.    Schools and  questions: Phone Number: [phone] or [phone] and Hours 7 a.m. to 7 p.m.                        Reason for Disposition    [1] Thigh, calf, or ankle swelling AND [2] only 1 side    Protocols used: LEG PAIN-A-AH

## 2021-06-08 NOTE — PROGRESS NOTES
Assessment/ Plan  1. Cervical radiculopathy at C6- suspected  Possible subtle weakness in bicep.  Patient has done extensive physical therapy and is remained active  Previous history, C6 by report  MRI and spine care referral.  - MR Cervical Spine With Contrast; Future  - Creatinine  - Ambulatory referral to Spine Care    2. Healthcare maintenance  Adacel and flu    Body mass index is 18.7 kg/(m^2).    Subjective  CC:  Chief Complaint   Patient presents with     Neck Pain     neck, back of arm. Tingly feeling 1 in or two.      HPI:  Neck pain  Narrative: began AM  -------------  Notes:  Duration/ timing of onset: 11/9,   Location L side, base of neck   Radiation? yes  Severity/ Quality: felt like pulled muscle,   Modifying factors: Make it worse- looking up   Make it better- finds the right position  Numbness or weakness? Tingling down arm  History of Neck problems/ previous work-up/ treatment? Yes- pt, previous injection; MRI  Saw TP 11/25, referred PT, 7-8 years ago, began after sledding at [place]-   Had a couple of injections, didn't help  Patient denies red flag symptoms of weight loss, night sweats, nighttime awakening, fever, chills, progressive weakness  PFSH:  Current medications reviewed as follows:  Current Outpatient Prescriptions on File Prior to Visit   Medication Sig     albuterol (PROVENTIL HFA;VENTOLIN HFA) 90 mcg/actuation inhaler Inhale 2 puffs every 6 (six) hours as needed for wheezing.     fluticasone (FLOVENT HFA) 44 mcg/actuation inhaler Inhale 1 puff 2 (two) times a day.     No current facility-administered medications on file prior to visit.      Patient Active Problem List   Diagnosis     Backache     Allergic Rhinitis     Benign Prostatic Hypertrophy     Benign Prostatic Hypertrophy With Urinary Obstruction     Prostatitis     Mild Intermittent Asthma     History   Smoking Status     Never Smoker   Smokeless Tobacco     Not on file     Social History     Social History Narrative    Not  "currently working- sold tire business     Patient Care Team:  Darrel Nunez MD as PCP - General (Family Medicine)  ROS  Full 10 system review including constitutional, respiratory, cardiac, gi, urinary, rheumatologic, neurologic, reproductive, dermatologic psychiatric is  performed (via questionnaire) and is negative        Objective  Physical Exam  Vitals:    01/25/17 0929   BP: 122/72   Patient Site: Right Arm   Patient Position: Sitting   Cuff Size: Adult Regular   Pulse: 64   Resp: 16   Temp: 96.8  F (36  C)   Weight: 123 lb (55.8 kg)   Height: 5' 8\" (1.727 m)     Full range of motion of neck, some pain with turning to the left, also some numbness when he is tilts his chin up.  Perhaps very, very subtle asymmetry with strength of flexion of the  Lelbow, otherwise upper extremity neurologic exam is normal.  Tenderness along the left paraspinous muscles  Patient otherwise appears fit, responsive and healthy.  Vital signs reviewed  Diagnostics  MRI of neck ordered.    Please note: Voice recognition software was used in this dictation.  It may therefore contain typographical errors.      "

## 2021-06-08 NOTE — PROGRESS NOTES
Assessment/Plan:      Diagnoses and all orders for this visit:    Cervical radicular pain  -     OPS TFESI C/T Spine Unilateral; Future; Expected date: 2/2/17    Neural foraminal stenosis of cervical spine  -     OPS TFESI C/T Spine Unilateral; Future; Expected date: 2/2/17    Myofascial pain        Assessment:    1.  Cervical spine pain with left arm pain and paresthesias to digit 1.  Most consistent with C6 radicular pain.  He does have cervical degenerative disc disease at a few levels with foraminal stenosis.  Most significant finding on my review of MRI is a left C5-6 foraminal stenosis but he also has C6-7 foraminal stenosis and C4-5 on the left.    2.  Myofascial pain cervical spine parascapular region.      Discussion:    1.  Discussed the diagnosis and treatment options.  We discussed the option of monitoring symptoms, medications, interventions, surgical referral.  Like to start conservatively.    2.  He is not wanting to take any more medications and will continue the ibuprofen over-the-counter.    3.  Left C5-6 transforaminal epidural steroid injection.    4.  Consideration for EMG but at this time based on his symptoms and MRI findings, fairly confident this is C6 radicular nature.    5.  Follow-up in 2-4 weeks after injection.      It was our pleasure caring for your patient today, if there any questions or concerns please do not hesitate to contact us.      Subjective:   Patient ID: Terell Sebastian is a 66 y.o. male.    History of Present Illness:Patient presents at the request of Dr. Darrel Nunez for an evaluation of cervical spine pain with left arm pain and paresthesias.  Symptoms started on November 9.  He did have a similar episode proximally 7 years ago while sledding.  Did some physical therapy underwent a few injections in the cervical spine which helped a little and eventually did some traction and that helped.  Had done well until November 9, 2016.  Awoke with significant left-sided neck pain  radiation to the left parascapular region down the left arm to the forearm and he does get numbness and tingling to the thumb.  Seems to be worse with any movement of his neck particularly looking to the left and in cervical extension.  Better with ibuprofen but he only takes 1 tablet twice a day.  Isidro positions really helps.  Using his arms or having his arm in certain positions can cause numbness down his arm as well.    Has had 8 visits with physical therapy with no significant improvement.  No paraparetic.  Did have an MRI.  No injections.    Imaging:MRI report and images were personally reviewed and discussed with the patient.  A plastic model was utilized during the discussion.  MRI of the cervical spine personally reviewed.  Multilevel degenerative disc disease relatively mild in the mid cervical spine.  This with a uncovertebral spurring results in bilateral foraminal stenosis C4-5 and C6-7 noted is severe on the left.  C5-6 noted as mild on the left but on my review this appears more at least moderate to severe on the T2 images.  No significant central stenosis.    Review of systems sleeping well.  No recent illnesses fevers chills sweats.  Some possible mild weakness in the left arm.  No bowel or bladder incontinence.Remainder of 12 point review systems negative unless listed above.  History reviewed. No pertinent past medical history.    The following portions of the patient's history were reviewed and updated as appropriate: allergies, current medications, past family history, past medical history, past social history, past surgical history and problem list.    WHO5:21      Objective:   Physical Exam:    General:  Well-appearing male in no acute distress.  Pleasant, cooperative, and interactive throughout the examination and interview.  CV: No lower extremity edema on inspection or paltation.  Lymphatics: No cervical lymphadenopathy palpated.  Eyes: sclera clear.  Skin: No rashes or lesions seen over  the head/neck, hairline, arms, legs, trunk.  Respirations unlabored.  MSK: Gait is normal.   Negative Romberg.  Spine: normal AP curves of the C, T, and L spine.  Full range of motion of the cervical spine rotation right, mild decreased extension, full flexion.  Mild decreased rotation left by approximately 20 ..  Palpation: Some mild tenderness of the left cervical paraspinals in the mid cervical spine and left upper trapezius region.  Extremities: Full range of motion of the shoulders, elbows, and wrists with no effusions or tenderness to palpation.  Negative arm drop, empty can, and Speed's test bilaterally.  Negative Hoffmann and Neer's test left.  Full range of motion of the  knees, and ankles from a seated position with no effusions or tenderness to palpation. No hypermobility of the upper or lower extremities.  Neurologic exam: Mental status: Patient is alert and oriented with normal affect.  Attention, knowledge, memory, and language are intact.  Normal coordination throughout the examination.  Reflexes are 2+ and symmetric biceps, triceps, brachioradialis, patellar, and Achilles with Negative Cher's.  Sensation is intact to light touch throughout the upper and lower extremities bilaterally.  Manual muscle testing reveals 5 out of 5 strength in the shoulder abductors, elbow flexors/extensors, wrist extensors, interosseous, and finger flexors; lower extremity strength appears grossly normal.   Normal muscle bulk and tone in the arms and legs.  Positive Spurling's to the left for proximal left arm and shoulder symptoms.

## 2021-06-09 NOTE — PROGRESS NOTES
Assessment/Plan:      Diagnoses and all orders for this visit:    Neural foraminal stenosis of cervical spine  -     Ambulatory referral to Physical Therapy    Cervical radicular pain  -     Ambulatory referral to Physical Therapy    Myofascial pain  -     Ambulatory referral to Physical Therapy        Assessment:    1.  Cervical spine pain with left arm pain and paresthesias to digit 1. Most consistent with C6 radicular pain. He does have cervical degenerative disc disease at a few levels with foraminal stenosis. Most significant finding on my review of MRI is a left C5-6 foraminal stenosis but he also has C6-7 foraminal stenosis.     2. Myofascial pain cervical spine parascapular region.       Discussion:    1.  Overall improved with cervical epidural at C5-C6.  He is at least 50% better.  He still has some remaining pain we discussed options including epidural at C6-7, therapy, medications.  He would like to avoid further interventions at this point if possible.    2.  Resume physical therapy for up to 2-4 visits for nerve glide specifically.    3.  Continue over-the-counter medications.    4.  We discussed options in the future of further injections or surgical referral.  At this point he is going to try therapy and see how things go.    5.  Follow-up as needed      It was our pleasure caring for your patient today, if there any questions or concerns please do not hesitate to contact us.      Subjective:   Patient ID: Terell Sebastian is a 66 y.o. male.    History of Present Illness: Patient presents for evaluation of cervical spine pain and left arm pain and paresthesias.  Had injection 2 weeks ago C5-6 transforaminal epidural.  At least 50% better.  The arm is much better but still has some left-sided neck pain and parascapular pain with turning his head to the left and looking down.  Pain is a 2/10 today which now his pain flares only up to a 4/10.  He can have pain at night with sleeping in a wrong position and  only occasional numbness and tingling in the arm.  No weakness in the arm.  Wondering what the next step is.  Overall he is pleased would like to continue to make progress.    Imaging:MRI report and images were personally reviewed and discussed with the patient.  A plastic model was utilized during the discussion.  MRI of the cervical spine and simple radiology personally reviewed.  They note C6-7 disc osteophyte complex and C5-6 with severe foraminal stenosis at C6-7.  On my review the C5-6 foraminal stenosis is also severe.    Review of systems:No weakness.  No bowel or bladder incontinence.  No headaches, dizziness, nausea, vomiting, blurred vision or balance deficits.        No past medical history on file.    The following portions of the patient's history were reviewed and updated as appropriate: allergies, current medications, past family history, past medical history, past social history, past surgical history and problem list.      Objective:   Physical Exam:    Vitals:    02/20/17 1112   BP: 139/80   Pulse: 67       General: Alert and oriented with normal affect. Attention, knowledge, memory, and language are intact. No acute distress.   Eyes: Sclerae are clear.  Respirations: Unlabored.   Gait:  Nonantalgic    Sensation is intact to light touch throughout the upper  extremities.  Reflexes are 2+ and symmetric in the biceps triceps and brachioradialis with negative Hoffmans.      Manual muscle testing reveals:  Right /Left out of 5  5/5 shoulder abductors  5/5 elbow flexors  5/5 elbow extensors  5/5 wrist extensors  5/5 interosseus  5/5 finger flexors

## 2021-06-09 NOTE — PROGRESS NOTES
[facility]   Cervical Thoracic Initial Evaluation    Patient Name: Terell Sebastian  Date of evaluation: 2/21/2017  Referral Diagnosis:   Neural foraminal stenosis of cervical spine [M99.81]  - Primary       Cervical radicular pain [M54.12]       Myofascial pain [M79.1]        Referring provider: Gutierrez Ann DO  Visit Diagnosis:     ICD-10-CM    1. Cervical radiculopathy M54.12    2. Decreased ROM of neck R29.898    3. Generalized muscle weakness M62.81    4. Poor posture R29.3        Assessment:       Per order: Evaluate and Treat:   c5-6 AND c6-7 FORAMINAL STENOSIS with radicular symptoms.    *Trial Nerve glides*  Up to  1-2 x/week for up to 2-4 weeks     MRI results in media file.    Impairments in  pain, posture, ROM, joint mobility, strength, sensory function, ADL's  Patient's signs and symptoms are consistent with left cervical spine pain with L radicular symtoms following the C6 pattern. Pt has previously been seen by PT for the same diagnosis but has received an injection since his discharge in Dec. His pain has improved by 50% and therapy will be directed towards higher level scapular strengthening and nerve glides (unable to progress previously). Pt presents with + neurodynamic testing, abnormalities in his posture due to weakness and pain..  Barriers to achieving goals as noted in the assessment section may affect outcome.  Prognosis to achieve goals is  good   Skilled PT is required to reduce pain and improve function.  Pt. is appropriate for skilled PT intervention as outlined in the Plan of Care (POC).  Pt. is a good candidate for skilled PT services to improve pain levels and function.        Goals:  Pt. will be independent with home exercise program in : 4 weeks (to self-manage pain and improve function)  Pt. will have improved quality of sleep: waking less times/night;in 4 weeks  Patient will reach / maintain arm movement: overhead;for home chores;for work;in 4 weeks;with less pain (less  than 3/10 pain and no radicular symptoms)  Patient will decrease : NDI score;for improved quality of life;in 4 weeks;by _ points  by ___ points: >= 5    Patient's expectations/goals are realistic.    Barriers to Learning or Achieving Goals:  No Barriers.       Plan / Patient Instructions:        Plan of Care:   Communication with: Referral Source  Patient Related Instruction: Nature of Condition;Treatment plan and rationale;Body mechanics;Expected outcome;Basis of treatment;Next steps;Precautions;Self Care instruction;Posture  Times per Week: 2  Number of Weeks: 2-4  Number of Visits: 8  Therapeutic Exercise: ROM;Stretching;Strengthening  Neuromuscular Reeducation: kinesio tape;posture;TNE;core  Manual Therapy: soft tissue mobilization;myofascial release;joint mobilization;muscle energy  Modalities: traction;electrical stimulation;TENS;ultrasound;cold pack;hot pack (as needed for pain)  Equipment: theraband    Plan for next visit: progress neurodynamics, manual nerve glides, prone Is/Ts, cervical retraction     Subjective:         Social information:   Occupation:self-employed   Equipment Available: None    History of Present Illness:    Terell is a 66 y.o. male who presents to therapy today with complaints of left sided neck pain with radiating symptoms down L arm. Date of onset/duration of symptoms is Nov 9, 2016 (just woke up that morning with pain). Reports prior similar episode 8 years ago after a whip lash injury while sledding. He received 3 injections and no therapy. He completed cervical traction at that time and his pain went away. Pt recently had an epidural injection that provided him with 50% relief of symptoms (about 2 weeks ago). Now the pain is intermittent. Reaching and turning to his left will increase his pain/tingling. Pt has previously been seen by PT in Dec 2016 for same diagnosis. Pt was receiving traction that would provide relief for about 1.5 hours. He has a cervical traction unit at home and  will start using it again now. Onset was gradual. Symptoms are intermittent and getting better. He reports  an episodic  history of similar symptoms (1 previous episode). He describes their previous level of function as not limited    Pain Rating:3  Pain rating at best: 1  Pain rating at worst: 8  Pain description: aching, dull, numbness and tingling; intermittent sharp pain with turning/aggravating symptoms    Functional limitations are described as occurring with:   Sleep  Change sleeping positions  Reach into cupboard  Turn head    Patient reports benefit from:  advil         Objective:      Note: Items left blank indicates the item was not performed or not indicated at the time of the evaluation.    Patient Outcome Measures :    Neck Disability Score in %: 18   Scores range from 0-100%, where a score of 0% represents minimal pain and maximal function. The minmal clinically important difference is a score reduction of 10%.    Cervical Thoracic Examination  1. Cervical radiculopathy     2. Decreased ROM of neck     3. Generalized muscle weakness     4. Poor posture       Precautions/Restrictions: None  Involved side: Left  Posture Observation:      General sitting posture is  fair.  Cervical:  Mild forward head    Cervical ROM:    Date: 2/21/2017     *Indicate scale AROM AROM AROM   Cervical Flexion Mild with pain     Cervical Extension Mod with tingling      Right Left Right Left Right Left   Cervical Sidebending mod Sever with pain       Cervical Rotation 65 35 with pain       Cervical Protraction      Cervical Retraction      Thoracic Flexion      Thoracic Extension      Thoracic Sidebending         Thoracic Rotation           Strength     Date: 2/21/2017     Cervical Myotomes/5 Right Left Right Left Right Left   Cervical Flexion (C1-2)         Cervical Sidebending (C3)         Shoulder Elevation (C4) 5 5       Shoulder Abduction (C5) 5 5       Elbow Flexion (C6) 5 5       Elbow Extension (C7) 5 5       Wrist  Flexion (C7) 5 5       Wrist Extension (C6) 5 5       Thumb abduction (C8) 5 5       Finger Abduction (T1) 5 5         Sensation         Reflex Testing  Cervical Dermatomes Right Left UE Reflexes Right Left   Back of the Head (C2)   Biceps (C5-6)     Supraclavicular Fossa (C3)   Brachioradialis (C5-6)     AC Joint (C4) WNL WNL Triceps (C7-8)     Lateral Biceps (C5) WNL WNL Cher s test     Palmar Thumb (C6) WNL Slightly diminished LE Reflexes     Palmar 3rd Finger (C7) WNL WNL Patellar (L3-4)     Palmar 5th Finger (C8) WNL WNL Achilles (S1-2)     Ulnar Forearm (T1) WNL WNL Babinski Response         Palpation: not tender at susan cervical paraspinals, occiputs or upper trapezius    Passive Mobility-Joint Integrity: Hypomobile.    Cervical Special Tests     Cervical Special Tests Right Left UE Nerve Mobility Right Left   Cervical compression  + Median nerve - + at 80 deg of elbow ext   Cervical distraction   Ulnar nerve     Spurling s test   Radial nerve     Shoulder abduction sign   Thoracic outlet     Deep neck flexor endurance test   Kyle     Upper cervical rotation   Adson s     Sharper-Payton   Cervical rotation lateral flexion     Alar ligament test   Other:     Other:   Other:         Treatment Today     TREATMENT MINUTES COMMENTS   Evaluation 20 -cervical spine   Self-care/ Home management     Manual therapy     Neuromuscular Re-education 15 -educated on neurodynamics and concepts of space, blood flow and movement required for a healthy nervous system. Educated on nerve glides and eventual progression to nerve tensioner exercises. Completed median nerve sliders   Therapeutic Activity     Therapeutic Exercises 15 -see exercise flow sheet  -educated on POC, diagnosis and HEP  -educated on use of ice for 20 min  -educated on re-starting cervical traction unit at home   Gait training     Modality__________________                Total 50    Blank areas are intentional and mean the treatment did not include these  items.     PT Evaluation Code: (Please list factors)  Patient History/Comorbidities: none  Examination: cervical spine  Clinical Presentation: stable  Clinical Decision Making: low    Patient History/  Comorbidities Examination  (body structures and functions, activity limitations, and/or participation restrictions) Clinical Presentation Clinical Decision Making (Complexity)   No documented Comorbidities or personal factors 1-2 Elements Stable and/or uncomplicated Low   1-2 documented comorbidities or personal factor 3 Elements Evolving clinical presentation with changing characteristics Moderate   3-4 documented comorbidities or personal factors 4 or more Unstable and unpredictable High                Aviva Sellers, PT, DPT  2/21/2017  11:14 AM

## 2021-06-09 NOTE — PROGRESS NOTES
[facility] Daily Progress     Patient Name: Terell Sebastian  Date: 2017  Visit #: 2  PTA visit #:  1  Referral Diagnosis: cervical radiculopathy  Referring provider: Darrel Nunez, *  Visit Diagnosis:     ICD-10-CM    1. Cervical radiculopathy M54.12    2. Decreased ROM of neck R29.898    3. Generalized muscle weakness M62.81    4. Poor posture R29.3    5. Acute neck pain M54.2          Assessment:     Cues for HEP/posture  Complaint with HEP    Goal Status: ongoing  Pt. will be independent with home exercise program in : 4 weeks (to self-manage pain and improve function)  Comment:: for pain <= 1/10  Pt. will decrease use of medication for pain for improved quality of life in : 4 weeks  Pt. will have improved quality of sleep: waking less times/night;in 4 weeks  Patient will sit: 30 minutes;for driving;with no pain;in 4 weeks  Patient will look up / down: for drinking;with no pain;in 4 weeks  Patient will reach / maintain arm movement: overhead;for home chores;for work;in 4 weeks;with less pain (less than 3/10 pain and no radicular symptoms)  Patient will decrease : NDI score;for improved quality of life;in 4 weeks;by _ points  by ___ points: >= 5    Plan / Patient Education:     Continue POC  Progress ex as able  Assess MT    Subjective:     Pain Ratin/10 injection 2 weeks ago  Started back to using home cervical traction yesterday  Ex 3x/day         Objective:   Cues for HEP/posture  Reviewed ex per flow sheet  Added  tray, max wilder, red potter nerve glides  MT per flow sheet        Treatment Today 2017     TREATMENT MINUTES COMMENTS   Evaluation     Self-care/ Home management     Manual therapy 10 SOR, c-distraction, 30 sec hold 10 sec release supine   Neuromuscular Re-education     Therapeutic Activity     Therapeutic Exercises 15 Ex per flow sheet   Gait training     Modality__________________                Total 25    Blank areas are intentional and mean the treatment did  not include these items.       Madhu Dodd  2/23/2017

## 2021-06-09 NOTE — PROGRESS NOTES
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 Cervical radiculopathy M54.12    2. Decreased ROM of neck R29.898    3. Generalized muscle weakness M62.81    4. Poor posture R29.3          Assessment:     HEP/POC compliance is  good .  Response to Intervention Progressed neurodynamics to include tensioner exercise. Pt tolerated manual nerve glides throughout end range neural tightness.  Patient is benefitting from skilled physical therapy and is making steady progress toward functional goals.  Patient is appropriate to continue with skilled physical therapy intervention, as indicated by initial plan of care.    Goal Status:  Pt. will be independent with home exercise program in : 4 weeks (to self-manage pain and improve function)  Pt. will have improved quality of sleep: waking less times/night;in 4 weeks  Patient will reach / maintain arm movement: overhead;for home chores;for work;in 4 weeks;with less pain (less than 3/10 pain and no radicular symptoms)  Patient will decrease : NDI score;for improved quality of life;in 4 weeks;by _ points  by ___ points: >= 5    Plan / Patient Education:     Continue with initial plan of care.  Progress with home program as tolerated. chin tucks, cervical isometrics    Subjective:     Pain Ratin L shoulder and in anterior forearm, no pain in his neck    Pain will come and go. Tingling has decreased significantly and is not as intense. Has not been using the traction unit as much recently.  Ex 3x/day     Objective:     Min cueing with HEP  Slight increase in pain in L forearm/anterior shoulder immediately after manual nerve glides that subsided with 1-2  min    Treatment Today     TREATMENT MINUTES COMMENTS   Evaluation     Self-care/ Home management     Manual therapy     Neuromuscular Re-education 23 -see exercise flow sheet  -supine manual nerve glides in median nerve bias in 90, 100 and 110 deg of abd  -supine active elbow flexion/ext with wrist ext completed during cervical side glides x5 sec oscillations, grade IV   Therapeutic Activity     Therapeutic Exercises 5 -UBE x3 min forward   Gait training     Modality__________________                Total 28    Blank areas are intentional and mean the treatment did not include these items.       Aviva Sellers, PT, DPT  2/28/2017

## 2021-06-09 NOTE — PROGRESS NOTES
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 Cervical radiculopathy M54.12    2. Decreased ROM of neck R29.898    3. Generalized muscle weakness M62.81    4. Poor posture R29.3          Assessment:     HEP/POC compliance is  good .  Patient demonstrates understanding/independence with home program.  Response to Intervention Continuing to report less to no pain and tingling is improving. Able to progress scapular strengthening and cervical strengthening without any pain.  Patient is benefitting from skilled physical therapy and is making steady progress toward functional goals.  Patient is appropriate to continue with skilled physical therapy intervention, as indicated by initial plan of care.    Goal Status:  Pt. will be independent with home exercise program in : 4 weeks (to self-manage pain and improve function)  Pt. will have improved quality of sleep: waking less times/night;in 4 weeks  Patient will reach / maintain arm movement: overhead;for home chores;for work;in 4 weeks;with less pain (less than 3/10 pain and no radicular symptoms)  Patient will decrease : NDI score;for improved quality of life;in 4 weeks;by _ points  by ___ points: >= 5    Plan / Patient Education:     Continue with initial plan of care.  Progress with home program as tolerated. seated or prone cervical ext, continue with nerve glides    Subjective:     Pain Ratin L shoulder and in anterior forearm but \"you know its there\", no pain in his neck    Tingling is still improving. \"comes and goes\" with certain movements and positions.    Ex 3x/day     Objective:     Minimal tingling with supine chin tuck that reduced with " decreasing ROM of chin tuck and went away completing after stopping exercise    Treatment Today     TREATMENT MINUTES COMMENTS   Evaluation     Self-care/ Home management     Manual therapy     Neuromuscular Re-education 10 -supine manual nerve glides in median nerve bias in 90, 100 and 110 deg of abd   Therapeutic Activity     Therapeutic Exercises 20 -UBE x4 min F/B, WL 2.0  -see exercise flow sheet   Gait training     Modality__________________                Total 30    Blank areas are intentional and mean the treatment did not include these items.       Aviva Sellers, PT, DPT  3/2/2017

## 2021-06-09 NOTE — PROGRESS NOTES
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 Cervical radiculopathy M54.12    2. Decreased ROM of neck R29.898    3. Generalized muscle weakness M62.81    4. Poor posture R29.3    5. Acute neck pain M54.2          Assessment:   Independent with HEP  12% improvement with NDI  Reported decreased pain when driving and turning his head    Goal Status:  Pt. will be independent with home exercise program in : 4 weeks (to self-manage pain and improve function)  Pt. will have improved quality of sleep: waking less times/night;in 4 weeks  Patient will reach / maintain arm movement: overhead;for home chores;for work;in 4 weeks;with less pain (less than 3/10 pain and no radicular symptoms)  Patient will decrease : NDI score;for improved quality of life;in 4 weeks;by _ points  by ___ points: >= 5 improved by 12 points    Plan / Patient Education:   Follow up with MD  No further PT scheduled    Subjective:     Pain Ratin L shoulder and in anterior forearm but \"you know its there\", no pain in his neck reports driving much better    Tingling is still improving. \"comes and goes\" with certain movements and positions.    Ex 3x/day     Objective:     Independent with HEP  12 point improvement with NDI  Reviewed nerve glides   CROM - mild loss flex, mod loss ext, mod loss for SB susan, rotation right 70 and left 40      Treatment Today     TREATMENT MINUTES COMMENTS   Evaluation     Self-care/ Home management     Manual therapy     Neuromuscular Re-education  -supine manual nerve glides in median nerve bias in 90, 100 and 110 deg of abd   Therapeutic Activity     Therapeutic Exercises 23 -UBE x4 min F/B, WL 2.0  -see exercise " flow sheet   Gait training     Modality__________________                Total 23    Blank areas are intentional and mean the treatment did not include these items.       Madhu Dodd, PT, DPT  3/14/2017   independent/needs device

## 2021-06-10 NOTE — PROGRESS NOTES
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 Cervical radiculopathy     2. Decreased ROM of neck     3. Generalized muscle weakness     4. Poor posture     5. Acute neck pain         Goals:  Pt. will be independent with home exercise program in : Met  Pt. will have improved quality of sleep: Met  Patient will reach / maintain arm movement: Met  No Data Recorded    Patient was seen for 5 visits from 2/21/17 to 3/14/17 with 0 missed appointments.  Patient received a home program UE/cervical strengthening, nerve glides  Pt had met all PT goals. He had intermittent tingling but overall symptoms had improved. He did not return for any additional follow up visits.    Therapy will be discontinued at this time.  The patient will need a new referral to resume.    Thank you for your referral.  Aviva Sellers, PT, DPT  5/1/2017  3:17 PM

## 2021-06-10 NOTE — PROGRESS NOTES
Ear Cerumen Removal    Date/Time: 8/10/2020 2:11 PM  Performed by: Katya Coles CNP  Authorized by: Katya Coles, CNP     Anesthesia:  Local Anesthetic: none  Location details: left ear  Patient tolerance: Patient tolerated the procedure well with no immediate complications  Procedure type: irrigation   Sedation:  Patient sedated: no

## 2021-06-10 NOTE — PROGRESS NOTES
Chief Complaint   Patient presents with     Cerumen Impaction     Left ear x2-3 weeks       ASSESSMENT & PLAN:   Diagnoses and all orders for this visit:    Impacted cerumen of left ear        MDM:  Cerumen impaction cleared with flush.  No signs of otitis externa today.    Supportive care discussed.  See discharge instructions below for specific recommendations given.    At the end of the encounter, I discussed results, diagnosis, medications. Discussed red flags for immediate return to clinic/ER, as well as indications for follow up if no improvement. Patient and/or caregiver understood and agreed to plan. Patient was stable for discharge.    SUBJECTIVE    HPI:  HPI  Terell Sebastian presents to the walk-in clinic with   Chief Complaint   Patient presents with     Cerumen Impaction     Left ear x2-3 weeks     Patient with plugged ear sensation on the left for 2 to 3 weeks.  Is an avid swimmer.  Does not feel any pain in the affected ear.  Right ear is normal.  Denies other symptoms such as cough, congestion.    See ROS for additional symptoms and/or pertinent negatives.       History obtained from the patient.    Past Medical History:   Diagnosis Date     Asthma        Active Ambulatory (Non-Hospital) Problems    Diagnosis     Bilateral inguinal hernia     Healthcare maintenance     Non-recurrent bilateral inguinal hernia without obstruction or gangrene     Erectile dysfunction     Actinic keratoses     Adenomatous polyp of colon     Backache     Allergic Rhinitis     BPH (benign prostatic hyperplasia)     Benign Prostatic Hypertrophy With Urinary Obstruction     Prostatitis     Mild intermittent asthma       Family History   Problem Relation Age of Onset     Alzheimer's disease Other         Paternal     Breast cancer Other         Paternal      Heart disease Father      Asthma Sister      Breast cancer Sister      Asthma Maternal Aunt      Pneumonia Mother        Social History     Tobacco Use     Smoking status:  Never Smoker     Smokeless tobacco: Never Used   Substance Use Topics     Alcohol use: Yes       Review of Systems   All other systems reviewed and are negative.      OBJECTIVE    Vitals:    08/10/20 1359   BP: 135/83   Pulse: 60   Resp: 18   Temp: 97.7  F (36.5  C)   TempSrc: Oral   SpO2: 100%       Physical Exam  Constitutional:       General: He is not in acute distress.     Appearance: He is well-developed.   HENT:      Right Ear: Tympanic membrane and external ear normal.      Left Ear: External ear normal. Decreased hearing noted. No drainage, swelling or tenderness. There is impacted cerumen.      Nose: No congestion or rhinorrhea.   Eyes:      General:         Right eye: No discharge.         Left eye: No discharge.      Conjunctiva/sclera: Conjunctivae normal.   Pulmonary:      Effort: Pulmonary effort is normal.   Musculoskeletal: Normal range of motion.   Skin:     General: Skin is warm and dry.      Capillary Refill: Capillary refill takes less than 2 seconds.   Neurological:      Mental Status: He is alert and oriented to person, place, and time.   Psychiatric:         Behavior: Behavior normal.         Thought Content: Thought content normal.         Judgment: Judgment normal.         Labs:  No results found for this or any previous visit (from the past 240 hour(s)).      Radiology:    No results found.    PATIENT INSTRUCTIONS:   There are no Patient Instructions on file for this visit.

## 2021-06-16 PROBLEM — K40.20 BILATERAL INGUINAL HERNIA: Status: ACTIVE | Noted: 2020-01-08

## 2021-06-16 PROBLEM — N52.9 ERECTILE DYSFUNCTION: Status: ACTIVE | Noted: 2018-04-26

## 2021-06-16 PROBLEM — K40.20 NON-RECURRENT BILATERAL INGUINAL HERNIA WITHOUT OBSTRUCTION OR GANGRENE: Status: ACTIVE | Noted: 2018-04-26

## 2021-06-16 PROBLEM — L57.0 ACTINIC KERATOSES: Status: ACTIVE | Noted: 2018-04-26

## 2021-06-16 PROBLEM — Z00.00 HEALTHCARE MAINTENANCE: Status: ACTIVE | Noted: 2018-04-26

## 2021-06-17 NOTE — PROGRESS NOTES
Adult Male Physical  A/P    Problem List Items Addressed This Visit        Unprioritized    BPH (benign prostatic hyperplasia)     Truly a non-problem, symptoms limited to some slow stream in the morning.  Reassured         Mild intermittent asthma     Asthma is currently well controlled  ACT= 23  Comment: Uses controller medicine, Flovent, only during allergy season, 1 puff twice daily    Current medications  Rescue: Albuterol:   Primary Controller: Flovent 44  Long Acting Bronchodilator?  No  Other meds?  No    Pneumovax/ PCV 13 UTD?  Given  Consider Flu vaccine if relevant: Gets this every year    Plan: No change                     Adenomatous polyp of colon     Reviewed recommendation for follow-up 2022         Healthcare maintenance     Healthcare maintenance assessment  Immunization- PCV 13 given, up-to-date  Cancer screening-discussion and declined PSA, up-to-date colon cancer screening  Vascular-blood pressure good, exercises regularly, lipid recheck  Other-none             Relevant Orders    Lipid Cascade (Completed)    Glucose (Completed)    Non-recurrent bilateral inguinal hernia without obstruction or gangrene     Right hemiscrotum with probable varicocele plus laxity of the inguinal ring, likely hernia, some laxity in the left side 2, less.  Discussed options, elected to follow-up with surgeon for consultation         Relevant Orders    Ambulatory referral to General Surgery    Erectile dysfunction     Uses very, very low-dose Viagra occasionally, cuts pills in thirds  Renewed          Relevant Medications    sildenafil (VIAGRA) 25 MG tablet    Actinic keratoses     Actinic Keratosis treatment    History noted on exam today, no complaints from patient    5Keratosis(s) were treated with liquid nitrogen.    Location(s): Both temples and left forehead  Appearance small erythematous and scaly  Cotton tipped applicator used to make frost ring on AKs and this was maintained for 1 minute   Was patient asked  to follow up for another treatment in 2-3 weeks?  No           Other Visit Diagnoses     Routine general medical examination at a health care facility    -  Primary                  HPI  Current Concerns/ Questions  68-year-old here today for physical exam.  [facility]:  Current medications reviewed as follows:  Current Outpatient Prescriptions on File Prior to Visit   Medication Sig     albuterol (PROVENTIL HFA;VENTOLIN HFA) 90 mcg/actuation inhaler Inhale 2 puffs every 6 (six) hours as needed for wheezing.     fluticasone (FLOVENT HFA) 44 mcg/actuation inhaler Inhale 1 puff 2 (two) times a day.     [DISCONTINUED] sildenafil (VIAGRA) 25 MG tablet Take 1 tablet (25 mg total) by mouth daily as needed for erectile dysfunction.     No current facility-administered medications on file prior to visit.      Patient Active Problem List   Diagnosis     Backache     Allergic Rhinitis     BPH (benign prostatic hyperplasia)     Benign Prostatic Hypertrophy With Urinary Obstruction     Prostatitis     Mild intermittent asthma     Adenomatous polyp of colon     Healthcare maintenance     Non-recurrent bilateral inguinal hernia without obstruction or gangrene     Erectile dysfunction     No past medical history on file.  No past surgical history on file.  Family History   Problem Relation Age of Onset     Alzheimer's disease       Paternal     Breast cancer       Paternal      Heart disease Father      Asthma Sister      Breast cancer Sister      Asthma Maternal Aunt      History   Smoking Status     Never Smoker   Smokeless Tobacco     Never Used     Other Habits:  Alcohol/ Drug use?  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    ROS  Full 10 system review including constitutional, respiratory, cardiac, gi, urinary, rheumatologic, neurologic, reproductive, dermatologic psychiatric is  performed (via questionnaire) and is negative       Objective  Physical Exam  Vitals:    04/26/18 0929   BP: 120/80   Patient Site: Right Arm   Patient Position: Sitting   Cuff Size: Adult Regular   Pulse: 60   Resp: 20   Temp: 97.2  F (36.2  C)   TempSrc: Oral   SpO2: 97%   Weight: 126 lb 5 oz (57.3 kg)   Height: 5' 7\" (1.702 m)       Gen- alert and oriented x3, no acute distress  HEENT- Normocephalic atraumatic   pupils equal and reactive, EOMI.    TMs visualized and normal, ear canals normal.    Mouth moist with normal mucosa no ulceration, dentition normal or in good repair.  Neck- supple, no adenopathy or thyromegaly  Chest- Normal chest wall apperance, normal inspiration and expiration.  Clear to asculation.  CV- Regular rate and rhythm, normal tones, no murmus, gallops or rubs.  Abd-  Soft, nodistended, nontender.  Normal bowel sounds, no mass or organ enlargement.  Genitalia- normal penis, scrotum, testicles.  Bilateral small inguinal hernias  Ext- Atraumatic,  No synovial thickening. Good perfusion, no edema. Periph pulses detected  Skin- warm and dry, no rash  Neuro- Cranial nerves grossly intact.  Normal gait, normal strength.  Reflexes symmetric.  Coordination intact.    Diagnostics  Pending                  "

## 2021-06-17 NOTE — TELEPHONE ENCOUNTER
RN cannot approve Refill Request    RN can NOT refill this medication PCP messaged that patient is overdue for Office Visit. Last office visit: 2/19/2019 Darrel Nunez MD Last Physical: 4/26/2018 Last MTM visit: Visit date not found Last visit same specialty: 2/19/2019 Darrel Nunez MD.  Next visit within 3 mo: Visit date not found  Next physical within 3 mo: Visit date not found      Perri Patel, [facility] Triage/Med Refill 5/17/2021    Requested Prescriptions   Pending Prescriptions Disp Refills     sildenafiL (VIAGRA) 25 MG tablet [Pharmacy Med Name: SILDENAFIL 25 MG TABLET] 6 tablet 9     Sig: TAKE 1 TABLET (25 MG TOTAL) BY MOUTH DAILY AS NEEDED FOR ERECTILE DYSFUNCTION.       Medications for Impotence Refill Protocol Failed - 5/17/2021  9:54 AM        Failed - PCP or prescribing provider visit in last year     Last office visit with prescriber/PCP: 2/19/2019 Darrel Nunez MD OR same dept: Visit date not found OR same specialty: 2/19/2019 Darrel Nunez MD  Last physical: 4/26/2018 Last MTM visit: Visit date not found   Next visit within 3 mo: Visit date not found  Next physical within 3 mo: Visit date not found  Prescriber OR PCP: Darrel Nunez MD  Last diagnosis associated with med order: 1. Erectile dysfunction, unspecified erectile dysfunction type  - sildenafiL (VIAGRA) 25 MG tablet [Pharmacy Med Name: SILDENAFIL 25 MG TABLET]; Take 1 tablet (25 mg total) by mouth daily as needed for erectile dysfunction.  Dispense: 6 tablet; Refill: 9    If protocol passes may refill for 12 months if within 3 months of last provider visit (or a total of 15 months).

## 2021-06-17 NOTE — PROGRESS NOTES
"HPI:  This is a 68 y.o. male here today with concerns of pain and bulging in his  bilateral groins area. He has noted this for the past few month(s). The symptoms have progressed and increased over this time. He comes in for evaluation secondary to the hernia causing enough issues to bother them with daily activities or chores.    Allergies:Review of patient's allergies indicates no known allergies.    History reviewed. No pertinent past medical history.    Past Surgical History:   Procedure Laterality Date     NO PAST SURGERIES         CURRENT MEDS:      Family History   Problem Relation Age of Onset     Alzheimer's disease       Paternal     Breast cancer       Paternal      Heart disease Father      Asthma Sister      Breast cancer Sister      Asthma Maternal Aunt      Pneumonia Mother         reports that he has never smoked. He has never used smokeless tobacco. He reports that he drinks alcohol. He reports that he does not use illicit drugs.    Review of Systems - Negative except bilateral inguinal bulges and pain, Otherwise twelve system of review is negative.      Vitals:    04/27/18 1227   BP: 142/85   Patient Site: Right Arm   Patient Position: Sitting   Cuff Size: Adult Small   Pulse: 67   SpO2: 99%   Weight: 126 lb (57.2 kg)   Height: 5' 7\" (1.702 m)       Body mass index is 19.73 kg/(m^2).    EXAM:  General: NAD   HEENT: normocephalic, PERRLA and EOMS intact  Mounth: Mucus membranes moist  Neck: Supple  Chest: Clear to auscultation bilaterally  CV: RRR  ABD: Soft nontender and nondistended, bilateral inguinal hernia, reducible  EXT: Warm, pulses intact,   Neuro: Alert and oriented x3  Back: no CVA tenderness    Assessment/Plan: Pt with a bilateral inguinal hernia. I discussed this at length with He.  I went over conservative management as well as surgical treatment of this.. I would plan on doing this via anlaparoscopic  approach with possible use of mesh. I went over the small risks of surgery including " but not limited to bleeding and infection, anesthesia, recurrence rates and nerve injury. I discussed the expected recovery time as well. Will get this scheduled. He will contact us to have this scheduled.      Ricardo Valerio MD  [facility] Department of Surgery

## 2021-06-18 NOTE — PATIENT INSTRUCTIONS - HE
Patient Instructions by Marcia Szymanski CNP at 4/29/2020  5:30 PM     Author: Marcia Szymanski CNP Service: -- Author Type: Nurse Practitioner    Filed: 4/29/2020  6:02 PM Encounter Date: 4/29/2020 Status: Signed    : Marcia Szymanski CNP (Nurse Practitioner)         Patient Education     Treatment for Bone Bruise (Bone Contusion)  A bone bruise is an injury to a bone that is less severe than a bone fracture. Bone bruises are fairly common. They can happen to people of all ages. Any type of bone in your body can get a bone bruise. Other injuries often happen along with a bone bruise, such as damage to nearby ligaments.  Types of treatment  Treatment for a bone bruise may include:    Resting the bone or joint    Putting an ice pack on the area several times a day    Raising the injury above the level of your heart to reduce swelling    Taking medicine to reduce pain and swelling    Wearing a brace or other device to limit movement  Your healthcare provider may give you advice about your diet. This is because eating a diet that is rich in calcium, vitamin D, and protein can help you heal. Your healthcare provider may ask you not to use certain over-the-counter medicines for pain. Some of these may delay normal bone healing. If you smoke, your healthcare provider will advise you to stop smoking. Smoking can also delay bone healing.  Your healthcare provider will tell you how long you should not put weight on your bone. Most bone bruises slowly heal over 2 to 4 months. A larger bone bruise may take longer to heal. You may not be able to return to sports activities for weeks or months. If your symptoms dont go away, your healthcare provider may order an MRI.  Possible complications of a bone bruise  Most bone bruises heal without any problems. If your bone bruise is very large, your body may have trouble getting blood flow back to the area. This can cause avascular necrosis of the bone. This leads to death  of that part of the bone.  When to call the healthcare provider  Call your healthcare provider if your symptoms dont start to get better in a few days. Call him or her right away if you have any severe symptoms, such as a high fever.  Date Last Reviewed: 5/1/2018 2000-2019 [employer]. [address] [place] [address], [place] [address]. All rights reserved. This information is not intended as a substitute for professional medical care. Always follow your healthcare professional's instructions.

## 2021-06-18 NOTE — PATIENT INSTRUCTIONS - HE
Patient Instructions by Katya Coles CNP at 8/10/2020  2:00 PM     Author: Katya Coles CNP Service: -- Author Type: Nurse Practitioner    Filed: 8/10/2020  2:12 PM Encounter Date: 8/10/2020 Status: Signed    : Katya Coles CNP (Nurse Practitioner)         Patient Education     Earwax Removal    The ear canal makes earwax from the canals lining. The ears make wax to lubricate and protect the ear canal. The ear canal is the tube that connects the middle ear to the outside of the ear. The wax protects the ear from bacteria, infection, and damage from water or trauma.  The wax that forms in the canal naturally moves toward the outside of the ear and falls out. In some cases, the ear may make too much wax. If the wax causes problems or keeps the healthcare provider from seeing into the ear, the extra wax may be removed.  Too much wax can affect your hearing. It can cause itching. In rare cases, it can be painful. Earwax should not be removed unless it is causing a problem. You should not stick objects into your ear to remove wax unless told to do so by your healthcare provider.  Healthcare providers can remove earwax safely. It is important to stay still during the procedure to avoid damage to the ear canal. But removing earwax generally doesnt hurt. You will not usually need anesthesia or pain medicine when the provider removes the earwax.  A number of conditions lead to earwax buildup. These include some skin problems, a narrow ear canal, or ears that make too much earwax. Using cotton swabs in the canal pushes earwax deeper into the ear and contributes to the buildup of earwax.  Home care    The healthcare provider may recommend mineral oil or an over-the-counter eardrop to use at home to soften the earwax. Use these products only if the provider recommends them. Carefully follow the instructions given.    Dont use mineral oil or OTC eardrops if you might have an ear infection or a ruptured  eardrum. Tell your healthcare provider right away if you have diabetes or an immune disorder.    Dont use cotton swabs in your ears. Cotton swabs may push wax deeper into the ear canal or damage the eardrum. Use cotton gauze or a wet washcloth  to gently remove wax on the outside of the ear and around the opening to the ear canal.    Don't use any probing device or object such as cotton-tipped swabs or isabel pins to clean the inside of your ears.    Dont use ear candles to clean your ears. Candling can be dangerous. It can burn the ear canal. It can also make the condition worse instead of better.    Dont use cold water to rinse the ear. This will make you dizzy. If your provider tells you to rinse your ear, use only warm water or follow his or her instructions.    Check the ear for signs of infection or irritation listed below under When to seek medical advice.  Steps for using eardrops  1. Warm the medicine bottle by rubbing it between your hands for a few minutes.  2. Lie down on your side, with the affected ear up.  3. Place the recommended number of drops in the ear. Wet a cotton ball with the medicine. Gently put the cotton ball into the ear opening.  Follow-up care  Follow up with your healthcare provider, or as directed.  When to seek medical advice  Call the provider right away if you have:    Ear pain that gets worse    Fever of 100.4F F (38 C) or higher, or as directed by your healthcare provider    Worsening wax buildup    Severe pain, dizziness, or nausea    Bleeding from the ear    Hearing problems    Signs of irritation from the eardrops, such as burning, stinging, or swelling and tenderness    Foul-smelling fluid draining from the ear    Swelling, redness, or tenderness of the outer ear    Headache, neck pain, or stiff neck  Date Last Reviewed: 6/1/2017 2000-2017 [employer]. [address] [place] [address], [place] [address]. All rights reserved. This information is not intended as a  substitute for professional medical care. Always follow your healthcare professional's instructions.

## 2021-06-24 NOTE — PROGRESS NOTES
Assessment/ Plan  1. Impetigo  Ulcerative skin lesion, right anterior shoulder.  Most likely scenario is that this is infectious  Patient thinks that the spider bite which I doubt    Discussed that there are other less common possibilities.    Cephalexin to cover impetigo, over-the-counter antifungal to cover for ulcerative tinea follow-up if not improving    Subjective    Chief Complaint   Patient presents with     Insect Bite     x 1 week on rt shoulder       HPI  Potential Skin infection    ___________________________  Notes  Duration/ Timing/ context-2 weeks ago  Location- R ant shoulder    Pain? Mostly itchy  Drainage?  Very little  Fever or other systemic symptoms?  No    Began as a swollen pink bump  Then red dot in the middle  Then worse,  Then bandaid and ulcerated when taking it off.  Current Outpatient Medications on File Prior to Visit   Medication Sig     albuterol (PROVENTIL HFA;VENTOLIN HFA) 90 mcg/actuation inhaler Inhale 2 puffs every 6 (six) hours as needed for wheezing.     fluticasone (FLOVENT HFA) 44 mcg/actuation inhaler Inhale 1 puff 2 (two) times a day.     No current facility-administered medications on file prior to visit.      Patient Active Problem List   Diagnosis     Backache     Allergic Rhinitis     BPH (benign prostatic hyperplasia)     Benign Prostatic Hypertrophy With Urinary Obstruction     Prostatitis     Mild intermittent asthma     Adenomatous polyp of colon     Healthcare maintenance     Non-recurrent bilateral inguinal hernia without obstruction or gangrene     Erectile dysfunction     Actinic keratoses     Past Surgical History:   Procedure Laterality Date     NO PAST SURGERIES       Family History   Problem Relation Age of Onset     Alzheimer's disease Unknown         Paternal     Breast cancer Unknown         Paternal      Heart disease Father      Asthma Sister      Breast cancer Sister      Asthma Maternal Aunt      Pneumonia Mother        ROS  As listed above      Objective  Physical Exam  Vitals:    02/19/19 1340   BP: 110/60   Patient Site: Right Arm   Patient Position: Sitting   Cuff Size: Adult Large   Pulse: 88   Resp: 20   Weight: 130 lb (59 kg)     Irregularly-shaped/oval ulceration with erythematous boundaries, fairly well demarcated, minimal crust, no purulence, no tenderness or induration      Please note: Voice recognition software was used in this dictation.  It may therefore contain typographical errors.

## 2021-06-26 ENCOUNTER — HEALTH MAINTENANCE LETTER (OUTPATIENT)
Age: 71
End: 2021-06-26

## 2021-06-29 NOTE — PROGRESS NOTES
Progress Notes by Marcia Szymanski CNP at 4/29/2020  5:30 PM     Author: Marcia Szymanski CNP Service: -- Author Type: Nurse Practitioner    Filed: 4/29/2020  7:05 PM Encounter Date: 4/29/2020 Status: Signed    : Marcia Szymanski CNP (Nurse Practitioner)       Chief Complaint   Patient presents with   ? Shin pain     L/T shin. Not painful but can feel it per pt. X1 week. No injury recalled. Pt has been icing it.        HPI:  Terell Sebastian is a 70 y.o. male who presents today complaining of LEFT lower shin pain over the past one week. Patient cannot recall a specific injury or fall, however does endorse increased gardening and notice mild discomfort 2 days ago. Patient notes taking one tablet to OTC ibuprofen with some relief, however noticed the most relief when his wife applied KT tape and ice application overnight. [place] area, wife asked for further evaluation. Patient denies any shortness of breath or chest pain symptoms. Denies any history of blood clots or CVA.     History obtained from the patient.    Problem List:  2020-01: Bilateral inguinal hernia  2018-04: Healthcare maintenance  2018-04: Non-recurrent bilateral inguinal hernia without obstruction   or gangrene  2018-04: Erectile dysfunction  2018-04: Actinic keratoses  2017-10: Adenomatous polyp of colon  Backache  Allergic Rhinitis  BPH (benign prostatic hyperplasia)  Benign Prostatic Hypertrophy With Urinary Obstruction  Prostatitis  Mild intermittent asthma      Past Medical History:   Diagnosis Date   ? Asthma        Social History     Tobacco Use   ? Smoking status: Never Smoker   ? Smokeless tobacco: Never Used   Substance Use Topics   ? Alcohol use: Yes       Review of Systems   Constitutional: Negative for activity change, appetite change, chills, fatigue and fever.   Respiratory: Negative for cough, shortness of breath and wheezing.    Cardiovascular: Negative for chest  "pain, palpitations and leg swelling.   Gastrointestinal: Negative for nausea and vomiting.   Musculoskeletal: Positive for myalgias.        \"shin splint pain\" left leg    Skin: Negative for color change, pallor, rash and wound.   Neurological: Negative for weakness and numbness.   Hematological: Negative for adenopathy. Does not bruise/bleed easily.   All other systems reviewed and are negative.      Vitals:    04/29/20 1727   BP: 158/82   Patient Site: Left Arm   Patient Position: Sitting   Cuff Size: Adult Regular   Pulse: 65   Resp: 16   Temp: 98.5  F (36.9  C)   TempSrc: Oral   SpO2: 99%   Weight: 121 lb 9.6 oz (55.2 kg)   Height: 5' 7.5\" (1.715 m)       Physical Exam  Constitutional:       General: He is not in acute distress.     Appearance: Normal appearance. He is not ill-appearing, toxic-appearing or diaphoretic.   Neck:      Musculoskeletal: Neck supple.   Cardiovascular:      Rate and Rhythm: Normal rate and regular rhythm.      Pulses: Normal pulses.      Heart sounds: Normal heart sounds. No murmur. No friction rub. No gallop.    Pulmonary:      Effort: Pulmonary effort is normal.      Breath sounds: Normal breath sounds.   Musculoskeletal:         General: Swelling, tenderness and signs of injury present. No deformity.      Right lower leg: No edema.      Left lower leg: No edema.      Comments: LEFT lower shin noted with a central area of tenderness, scant swelling and eccyhmosis noted. Outlined. Mild warmth no erythema.    No tenderness in calf with palpation/flexion and extension. FROM, strength 5/5 and pulses 2+   Lymphadenopathy:      Cervical: No cervical adenopathy.   Skin:     General: Skin is warm.      Capillary Refill: Capillary refill takes less than 2 seconds.      Coloration: Skin is not pale.      Findings: Bruising present. No rash.   Neurological:      General: No focal deficit present.      Mental Status: He is alert and oriented to person, place, and time. Mental status is at " baseline.      Cranial Nerves: No cranial nerve deficit.      Sensory: No sensory deficit.      Motor: No weakness.      Coordination: Coordination normal.      Gait: Gait normal.      Deep Tendon Reflexes: Reflexes normal.   Psychiatric:         Mood and Affect: Mood normal.         Behavior: Behavior normal.         No notes on file    Labs:  No results found for this or any previous visit (from the past 72 hour(s)).    Radiology:No results found.    Clinical Decision Making: At the end of the encounter, I discussed results, diagnosis, medications. Discussed red flags concerns for DVT versus a superficial bruise and reviewed specific symptoms that warrant immediate return to the ER for further evaluation. Also discussed that clinical presentation notes improvement with RICE measures and KT Tape, continue and monitor, if not improving as discussed in 1 week return for reevaluation. Patient understood and agreed to plan.     ISIDRA Bhat, CNP       1. Bone bruise           Patient Instructions     Patient Education     Treatment for Bone Bruise (Bone Contusion)  A bone bruise is an injury to a bone that is less severe than a bone fracture. Bone bruises are fairly common. They can happen to people of all ages. Any type of bone in your body can get a bone bruise. Other injuries often happen along with a bone bruise, such as damage to nearby ligaments.  Types of treatment  Treatment for a bone bruise may include:    Resting the bone or joint    Putting an ice pack on the area several times a day    Raising the injury above the level of your heart to reduce swelling    Taking medicine to reduce pain and swelling    Wearing a brace or other device to limit movement  Your healthcare provider may give you advice about your diet. This is because eating a diet that is rich in calcium, vitamin D, and protein can help you heal. Your healthcare provider may ask you not to use certain over-the-counter medicines for pain.  Some of these may delay normal bone healing. If you smoke, your healthcare provider will advise you to stop smoking. Smoking can also delay bone healing.  Your healthcare provider will tell you how long you should not put weight on your bone. Most bone bruises slowly heal over 2 to 4 months. A larger bone bruise may take longer to heal. You may not be able to return to sports activities for weeks or months. If your symptoms dont go away, your healthcare provider may order an MRI.  Possible complications of a bone bruise  Most bone bruises heal without any problems. If your bone bruise is very large, your body may have trouble getting blood flow back to the area. This can cause avascular necrosis of the bone. This leads to death of that part of the bone.  When to call the healthcare provider  Call your healthcare provider if your symptoms dont start to get better in a few days. Call him or her right away if you have any severe symptoms, such as a high fever.  Date Last Reviewed: 5/1/2018 2000-2019 [employer]. [address] [place] [address], [place] [address]. All rights reserved. This information is not intended as a substitute for professional medical care. Always follow your healthcare professional's instructions.

## 2021-10-16 ENCOUNTER — HEALTH MAINTENANCE LETTER (OUTPATIENT)
Age: 71
End: 2021-10-16

## 2022-07-23 ENCOUNTER — HEALTH MAINTENANCE LETTER (OUTPATIENT)
Age: 72
End: 2022-07-23

## 2022-10-01 ENCOUNTER — HEALTH MAINTENANCE LETTER (OUTPATIENT)
Age: 72
End: 2022-10-01

## 2022-11-07 ENCOUNTER — TRANSFERRED RECORDS (OUTPATIENT)
Dept: HEALTH INFORMATION MANAGEMENT | Facility: CLINIC | Age: 72
End: 2022-11-07

## 2023-01-02 PROBLEM — D12.6 ADENOMATOUS POLYP OF COLON: Status: ACTIVE | Noted: 2017-10-04

## 2023-01-10 DIAGNOSIS — J45.20 MILD INTERMITTENT ASTHMA, UNSPECIFIED WHETHER COMPLICATED: Primary | ICD-10-CM

## 2023-01-10 DIAGNOSIS — N52.9 ERECTILE DYSFUNCTION, UNSPECIFIED ERECTILE DYSFUNCTION TYPE: ICD-10-CM

## 2023-01-12 RX ORDER — SILDENAFIL 25 MG/1
TABLET, FILM COATED ORAL
Qty: 6 TABLET | Refills: 9 | Status: SHIPPED | OUTPATIENT
Start: 2023-01-12 | End: 2024-05-09

## 2023-01-12 RX ORDER — FLUTICASONE PROPIONATE 44 UG/1
2 AEROSOL, METERED RESPIRATORY (INHALATION) 2 TIMES DAILY
Qty: 10.6 G | Refills: 3 | Status: SHIPPED | OUTPATIENT
Start: 2023-01-12

## 2023-02-08 ENCOUNTER — TRANSFERRED RECORDS (OUTPATIENT)
Dept: HEALTH INFORMATION MANAGEMENT | Facility: CLINIC | Age: 73
End: 2023-02-08

## 2023-02-09 ENCOUNTER — TRANSFERRED RECORDS (OUTPATIENT)
Dept: HEALTH INFORMATION MANAGEMENT | Facility: CLINIC | Age: 73
End: 2023-02-09

## 2023-08-06 ENCOUNTER — HEALTH MAINTENANCE LETTER (OUTPATIENT)
Age: 73
End: 2023-08-06

## 2023-08-15 ENCOUNTER — TRANSFERRED RECORDS (OUTPATIENT)
Dept: HEALTH INFORMATION MANAGEMENT | Facility: CLINIC | Age: 73
End: 2023-08-15
Payer: COMMERCIAL

## 2023-09-13 ENCOUNTER — TRANSFERRED RECORDS (OUTPATIENT)
Dept: HEALTH INFORMATION MANAGEMENT | Facility: CLINIC | Age: 73
End: 2023-09-13
Payer: COMMERCIAL

## 2024-01-21 ENCOUNTER — NURSE TRIAGE (OUTPATIENT)
Dept: NURSING | Facility: CLINIC | Age: 74
End: 2024-01-21
Payer: COMMERCIAL

## 2024-01-21 ENCOUNTER — ANCILLARY PROCEDURE (OUTPATIENT)
Dept: ULTRASOUND IMAGING | Facility: HOSPITAL | Age: 74
End: 2024-01-21
Attending: EMERGENCY MEDICINE
Payer: COMMERCIAL

## 2024-01-21 ENCOUNTER — HOSPITAL ENCOUNTER (EMERGENCY)
Facility: HOSPITAL | Age: 74
Discharge: ANOTHER HOSPITAL | End: 2024-01-21
Attending: EMERGENCY MEDICINE | Admitting: EMERGENCY MEDICINE
Payer: COMMERCIAL

## 2024-01-21 ENCOUNTER — HOSPITAL ENCOUNTER (EMERGENCY)
Facility: CLINIC | Age: 74
Discharge: HOME | End: 2024-01-21
Attending: EMERGENCY MEDICINE | Admitting: EMERGENCY MEDICINE
Payer: COMMERCIAL

## 2024-01-21 VITALS
RESPIRATION RATE: 18 BRPM | SYSTOLIC BLOOD PRESSURE: 153 MMHG | DIASTOLIC BLOOD PRESSURE: 94 MMHG | HEART RATE: 60 BPM | OXYGEN SATURATION: 99 % | TEMPERATURE: 98.1 F

## 2024-01-21 VITALS
OXYGEN SATURATION: 100 % | BODY MASS INDEX: 18.91 KG/M2 | DIASTOLIC BLOOD PRESSURE: 98 MMHG | WEIGHT: 124.8 LBS | RESPIRATION RATE: 18 BRPM | HEART RATE: 64 BPM | TEMPERATURE: 97.1 F | SYSTOLIC BLOOD PRESSURE: 166 MMHG | HEIGHT: 68 IN

## 2024-01-21 DIAGNOSIS — H33.21 RIGHT RETINAL DETACHMENT: ICD-10-CM

## 2024-01-21 DIAGNOSIS — H53.9 VISION CHANGES: ICD-10-CM

## 2024-01-21 LAB
ANION GAP SERPL CALCULATED.3IONS-SCNC: 10 MMOL/L (ref 7–15)
BUN SERPL-MCNC: 19.4 MG/DL (ref 8–23)
CALCIUM SERPL-MCNC: 8.9 MG/DL (ref 8.8–10.2)
CHLORIDE SERPL-SCNC: 105 MMOL/L (ref 98–107)
CREAT SERPL-MCNC: 0.89 MG/DL (ref 0.67–1.17)
DEPRECATED HCO3 PLAS-SCNC: 28 MMOL/L (ref 22–29)
EGFRCR SERPLBLD CKD-EPI 2021: >90 ML/MIN/1.73M2
ERYTHROCYTE [DISTWIDTH] IN BLOOD BY AUTOMATED COUNT: 13.6 % (ref 10–15)
GLUCOSE SERPL-MCNC: 93 MG/DL (ref 70–99)
HCT VFR BLD AUTO: 43.3 % (ref 40–53)
HGB BLD-MCNC: 14.6 G/DL (ref 13.3–17.7)
HOLD SPECIMEN: NORMAL
INR PPP: 1.04 (ref 0.85–1.15)
MCH RBC QN AUTO: 32.4 PG (ref 26.5–33)
MCHC RBC AUTO-ENTMCNC: 33.7 G/DL (ref 31.5–36.5)
MCV RBC AUTO: 96 FL (ref 78–100)
PLATELET # BLD AUTO: 223 10E3/UL (ref 150–450)
POTASSIUM SERPL-SCNC: 4.2 MMOL/L (ref 3.4–5.3)
RBC # BLD AUTO: 4.51 10E6/UL (ref 4.4–5.9)
SODIUM SERPL-SCNC: 143 MMOL/L (ref 135–145)
WBC # BLD AUTO: 4.6 10E3/UL (ref 4–11)

## 2024-01-21 PROCEDURE — 99207 PR SERVICE NOT STAFFED W/SUPERV PROV: CPT | Performed by: STUDENT IN AN ORGANIZED HEALTH CARE EDUCATION/TRAINING PROGRAM

## 2024-01-21 PROCEDURE — 99285 EMERGENCY DEPT VISIT HI MDM: CPT

## 2024-01-21 PROCEDURE — 99282 EMERGENCY DEPT VISIT SF MDM: CPT | Performed by: EMERGENCY MEDICINE

## 2024-01-21 PROCEDURE — 85610 PROTHROMBIN TIME: CPT | Performed by: EMERGENCY MEDICINE

## 2024-01-21 PROCEDURE — 99285 EMERGENCY DEPT VISIT HI MDM: CPT | Performed by: EMERGENCY MEDICINE

## 2024-01-21 PROCEDURE — 76512 OPH US DX B-SCAN: CPT | Mod: RT

## 2024-01-21 PROCEDURE — 85027 COMPLETE CBC AUTOMATED: CPT | Performed by: EMERGENCY MEDICINE

## 2024-01-21 PROCEDURE — 36415 COLL VENOUS BLD VENIPUNCTURE: CPT | Performed by: EMERGENCY MEDICINE

## 2024-01-21 PROCEDURE — 80048 BASIC METABOLIC PNL TOTAL CA: CPT | Performed by: EMERGENCY MEDICINE

## 2024-01-21 ASSESSMENT — ACTIVITIES OF DAILY LIVING (ADL): ADLS_ACUITY_SCORE: 35

## 2024-01-21 NOTE — CONSULTS
OPHTHALMOLOGY CONSULT NOTE  01/21/24    Patient: Terell Sebastian      ASSESSMENT/PLAN:     Terell Sebastian is a 73 year old male who presented with       #Macula off retinal detachment right eye   - This is a 73 year old phakic male with no past ocular history who is presenting with a mac off superior RRD of the right eye. The retinal detachment is from approx 10-2 clock hours.   - VA  20/400 -1 right eye, 20/25 left eye   - No APD   - Fundus exam showed mac-off superior RRD of the right eye from approx 10-2 clock hours.. 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   - Recommend surgical intervention. Given that this is macula off, it is appropriate to perform surgery within 7 to 10 days of the onset. Will have the patient follow up in retina clinic tomorrow morning at 9 am for further evaluation. Please give patient address and time prior to discharge from ED.  - NPO at midnight in the event surgery is performed tomorrow.     It is our pleasure to participate in this patient's care and treatment. Please contact us with any further questions or concerns.    Discussed with Dr. Zavaleta and Dr. Burgos who agreed with this assessment and plan.     Pierre Coon MD     HISTORY OF PRESENTING ILLNESS:     Terell Sebasitan is a 73 year old male who presented on 1/21/24 with right eye vision changes. States that he began to notice a blakc dome at the bottom of his right eye Wednesday (about 5 days ago). It slowly began to grow over the past few days until the point where it is now obstructing the center of his vision. No pain. No redness. No flashes or floaters. No history of eye problems. No recent trauma. Patient has never worn glasses. No history of ocular surgery.       10+ review of systems were otherwise negative except for that which has been stated above.      OCULAR/MEDICAL/SURGICAL HISTORIES:     Past Ocular History:   None    Eye Drops:   None    Pertinent Systemic Medications:   None    Past Medical  History:  Past Medical History:   Diagnosis Date    Asthma        Past Surgical History:   Past Surgical History:   Procedure Laterality Date    NO PAST SURGERIES      OR LAP,INGUINAL HERNIA REPR,INITIAL Bilateral 1/24/2020    Procedure: Laparoscopic bilateral inguinal hernia repair;  Surgeon: Ricardo Valerio MD;  Location: [facility];  Service: General       Family History:  Family History   Problem Relation Age of Onset    Alzheimer Disease Other         Paternal    Breast Cancer Other         Paternal     Heart Disease Father     Asthma Sister     Breast Cancer Sister     Asthma Maternal Aunt     Pneumonia Mother          Social History:  Social History     Socioeconomic History    Marital status: Single     Spouse name: Not on file    Number of children: Not on file    Years of education: Not on file    Highest education level: Not on file   Occupational History    Not on file   Tobacco Use    Smoking status: Never    Smokeless tobacco: Never   Substance and Sexual Activity    Alcohol use: Yes    Drug use: No    Sexual activity: Not on file   Other Topics Concern    Not on file   Social History Narrative    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     Social Determinants of Health     Financial Resource Strain: Not on file   Food Insecurity: Not on file   Transportation Needs: Not on file   Physical Activity: Not on file   Stress: Not on file   Social Connections: Not on file   Interpersonal Safety: Not on file   Housing Stability: Not on file         EXAMINATION:     Base Eye Exam       Visual Acuity (Snellen - Linear)         Right Left    Near sc 20/800 20/25 -2    Near cc NI with ph               Tonometry (Tonopen, 4:57 PM)         Right Left    Pressure 15 24              Pupils         Pupils APD    Right PERRL None    Left PERRL None              Visual Fields (Counting fingers)         Left Right     Full     Restrictions  Total inferior temporal, inferior nasal deficiencies               Extraocular Movement         Right Left     Full, Ortho Full, Ortho              Neuro/Psych       Oriented x3: Yes    Mood/Affect: Normal              Dilation       Both eyes: 1.0% Mydriacyl, 2.5% Alphonse Synephrine @ 4:58 PM                  Additional Tests       Color         Right Left    Ishihara x 11/11                  Slit Lamp and Fundus Exam       External Exam         Right Left    External Normal Normal              Slit Lamp Exam         Right Left    Lids/Lashes Normal Normal    Conjunctiva/Sclera White and quiet White and quiet    Cornea Clear Clear    Anterior Chamber Deep and quiet Deep and quiet    Iris Dilated Dilated    Lens Clear Clear    Anterior Vitreous Normal Normal              Fundus Exam         Right Left    Disc Normal Normal    Macula superior macula elevated Normal    Vessels superior arcades elevated Normal    Periphery mac-off superior RRD of the right eye from approx 10-2 clock hours. ?Tear superiorly. Some lattice vs CR scars temporally. Normal                    Labs/Studies/Imaging Performed  None        Pierre Coon M.D.  PGY-3 Resident Physician   Department of Ophthalmology  01/21/24 4:52 PM   Pager: [phone]

## 2024-01-21 NOTE — ED PROVIDER NOTES
"EMERGENCY DEPARTMENT ENCOUNTER      NAME: Terell Sebastian  AGE: 73 year old male  YOB: 1950  MRN: [id number]  EVALUATION DATE & TIME: 1/21/2024  1:52 PM    PCP: Darrel Nunez    ED PROVIDER: Kvng Mitchell M.D.      Chief Complaint   Patient presents with    Eye Problem     right         FINAL IMPRESSION:  Right retinal detachment      ED COURSE & MEDICAL DECISION MAKING:    Pertinent Labs & Imaging studies reviewed. (See chart for details)  73 year old male presents to the Emergency Department for evaluation of decreased vision in the lower visual field with the right eye.  Initially had a small bleb of blurriness it was like \"a water bubble\".  This was on Wednesday.  Since then it is gradually progressed.  Now he can only see upper half of the visual field with his right eye.  No changes in the left eye.  Patient without any other symptoms.  Typically healthy and not on any medications.  Will obtain bedside ultrasound to assess for retinal detachment.. Patient appears non toxic with stable vitals signs. Overall exam is benign.          2:05 PM I met with the patient for the initial interview and physical examination. Discussed plan for treatment and workup in the ED.    2:10 PM.  Ultrasound obtained.  Retinal detachment noted.  Baseline blood work being obtained with call placed to her [facility] for transfer for ophthalmologic evaluation  2:20 PM.  Patient discussed with Dr. Valdez in the emergency room.  She is agreeable with plan for transfer.  She will obtain ophthalmologic services on patient's arrival.  Baseline blood work being obtained here.  Plan for discharge and immediate visit to [facility] emergency room discussed at length.  They are agreeable.  t the conclusion of the encounter I discussed the results of all of the tests and the disposition. The questions were answered and return precautions provided. The patient or family acknowledged understanding and was agreeable " with the care plan.       PPE: Provider wore paper mask.     MEDICATIONS GIVEN IN THE EMERGENCY:  Medications - No data to display    NEW PRESCRIPTIONS STARTED AT TODAY'S ER VISIT  New Prescriptions    No medications on file          =================================================================    HPI    Patient information was obtained from: patient and his wife.    Use of Intrepreter: N/A       Terell Sebastian is a 73 year old male with a pertient medical history of asthma who presents to the ED for evaluation of right eye problem.  Patient reports bubble in his lower vision first noted on Wednesday.  Since then is gradually worsened and is now lost the lower half of vision in the right eye.  Denies any headaches or ataxia.  Wife reports she told him about the visual losses today and she prompted the evaluation.          REVIEW OF SYSTEMS   Constitutional:  Denies fever, chills  Respiratory:  Denies productive cough or increased work of breathing  Cardiovascular:  Denies chest pain, palpitations  GI:  Denies abdominal pain, nausea, vomiting, or change in bowel or bladder habits   Musculoskeletal:  Denies any new muscle/joint swelling  Skin:  Denies rash   Neurologic:  Denies focal weakness.  Isolated loss of lower visual field with right  All systems negative except as marked.     PAST MEDICAL HISTORY:  Past Medical History:   Diagnosis Date    Asthma        PAST SURGICAL HISTORY:  Past Surgical History:   Procedure Laterality Date    NO PAST SURGERIES      [place] LAP,INGUINAL HERNIA REPR,INITIAL Bilateral 1/24/2020    Procedure: Laparoscopic bilateral inguinal hernia repair;  Surgeon: Ricardo Valerio MD;  [facility]: [facility];  Service: General         CURRENT MEDICATIONS:    No current facility-administered medications for this encounter.    Current Outpatient Medications:     albuterol (PROVENTIL HFA;VENTOLIN HFA) 90 mcg/actuation inhaler, [ALBUTEROL (PROVENTIL HFA;VENTOLIN HFA) 90 MCG/ACTUATION  "INHALER] Inhale 2 puffs every 6 (six) hours as needed for wheezing., Disp: , Rfl:     fluticasone (FLOVENT HFA) 44 MCG/ACT inhaler, Inhale 2 puffs into the lungs 2 times daily, Disp: 10.6 g, Rfl: 3    fluticasone (FLOVENT HFA) 44 mcg/actuation inhaler, [FLUTICASONE (FLOVENT HFA) 44 MCG/ACTUATION INHALER] Inhale 1 puff 2 (two) times a day., Disp: , Rfl:     multivitamin with minerals (THERA-M) 9 mg iron-400 mcg Tab tablet, [MULTIVITAMIN WITH MINERALS (THERA-M) 9 MG IRON-400 MCG TAB TABLET] Take 1 tablet by mouth daily., Disp: , Rfl:     sildenafil (VIAGRA) 25 MG tablet, TAKE 1 TABLET (25 MG TOTAL) BY MOUTH DAILY AS NEEDED FOR ERECTILE DYSFUNCTION., Disp: 6 tablet, Rfl: 9    vitamin E 1000 UNIT capsule, [VITAMIN E 1000 UNIT CAPSULE] Take 1,000 Units by mouth daily., Disp: , Rfl:     ALLERGIES:  No Known Allergies    FAMILY HISTORY:  Family History   Problem Relation Age of Onset    Alzheimer Disease Other         Paternal    Breast Cancer Other         Paternal     Heart Disease Father     Asthma Sister     Breast Cancer Sister     Asthma Maternal Aunt     Pneumonia Mother        SOCIAL HISTORY:   Social History     Socioeconomic History    Marital status: Single   Tobacco Use    Smoking status: Never    Smokeless tobacco: Never   Substance and Sexual Activity    Alcohol use: Yes    Drug use: No   Social History Narrative    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 Bilateral external ears normal. Oropharynx moist. Nose normal. Neck- Normal range of motion with no guarding, Supple, No stridor.   Eyes:  PERRL, EOMI with no signs of entrapment, Conjunctiva normal, No discharge.  With direct confrontation " patient with absence of right lower visual field.  Respiratory:  Normal breath sounds, No respiratory distress, No wheezing.    Cardiovascular:  Normal heart rate, Normal rhythm, No appreciable rubs or gallops.   GI:  Soft, No tenderness, No distension, No palpable masses  Musculoskeletal:  Intact distal pulses, No edema. Good range of motion in all major joints. No tenderness to palpation or major deformities noted.  Integument:  Warm, Dry, No erythema, No rash.   Neurologic:  Alert & oriented, Normal motor function, Normal sensory function, No focal deficits noted.   Psychiatric:  Affect normal, Judgment normal, Mood normal.     LAB:  All pertinent labs reviewed and interpreted.         PROCEDURES:   PROCEDURE: Emergency Department Limited Bedside Screening Ultrasound   ANATOMICAL WINDOW: Right eye   INDICATIONS: Loss of inferior vision   PROCEDURE PROVIDER:   Kvng Mitchell   FINDINGS: Retinal detachment   IMAGES SAVED AND STORED FOR ARCHIVE AND REVIEW: Yes         I, Vicente Motta, am serving as a scribe to document services personally performed by Kvng Mitchell MD, based on my observation and the provider's statements to me. I, Kvng Mitchell MD attest that Vicente Motta is acting in a scribe capacity, has observed my performance of the services and has documented them in accordance with my direction.    Kvng Mitchell M.D.  Emergency Medicine  [facility] EMERGENCY DEPARTMENT     Kvng Mitchell MD  01/21/24 5263

## 2024-01-21 NOTE — ED PROVIDER NOTES
Patient signed out pending ophthalmology recommendations. The ophthalmology team evaluated the patient and confirmed diagnosis of retinal detachment. They plan for outpatient evaluation in clinic tomorrow for additional procedural planning and intervention. Patient discharged with plan for clinic evaluation tomorrow morning at 9am for further care.      Pepito Farris MD  01/21/24 3610

## 2024-01-21 NOTE — ED TRIAGE NOTES
"Pt presents with vision change to right eye that started Wednesday. Pt noticed that bottom part of right eye felt like he had fluid in the bottom, \"like a water bubble.\" It increased to half of the eye blacked out in a domed shaped. Pt can see out of the remaining part of eye. No in jury noted.     Triage Assessment (Adult)       Row Name 01/21/24 6036          Triage Assessment    Airway WDL WDL        Respiratory WDL    Respiratory WDL WDL        Skin Circulation/Temperature WDL    Skin Circulation/Temperature WDL WDL        Cardiac WDL    Cardiac WDL WDL        Peripheral/Neurovascular WDL    Peripheral Neurovascular WDL WDL        Cognitive/Neuro/Behavioral WDL    Cognitive/Neuro/Behavioral WDL WDL                     "

## 2024-01-21 NOTE — DISCHARGE INSTRUCTIONS
TODAY'S VISIT:  You were seen today for eye problem   -   - If you had any labs or imaging/radiology tests performed today, you should also discuss these tests with your usual provider.     FOLLOW-UP:  Please make an appointment to follow up with:  - Your Primary Care Provider. If you do not have a PCP, please call the Primary Care Center (phone: [phone] for an appointment  - Eye Clinic (phone: [phone])     - Have your provider review the results from today's visit with you again to make sure no further follow-up or additional testing is needed based on those results.     RETURN TO THE EMERGENCY DEPARTMENT  Return to the Emergency Department at any time for any new or worsening symptoms or any concerns.

## 2024-01-21 NOTE — ED TRIAGE NOTES
Sent from [facility], detached retina in right eye. Noticed vision changes starting Wednesday.          Triage Assessment (Adult)       Row Name 01/21/24 1532          Triage Assessment    Airway WDL WDL        Respiratory WDL    Respiratory WDL WDL        Skin Circulation/Temperature WDL    Skin Circulation/Temperature WDL WDL        Cardiac WDL    Cardiac WDL WDL        Peripheral/Neurovascular WDL    Peripheral Neurovascular WDL WDL        Cognitive/Neuro/Behavioral WDL    Cognitive/Neuro/Behavioral WDL WDL

## 2024-01-21 NOTE — TELEPHONE ENCOUNTER
The daughter is calling with patient, she reports he is complaining of half of his vision in one eye is black, as if there is a curtain over the eye.  He reports increased watering of the eye.  Symptoms started on Wednesday 01/17/24  Triage guidelines recommend to GO TO ED NOW (OR PCP TRIAGE)  Caller verbalized and understands directives    Reason for Disposition   [1] Blurred vision or visual changes AND [2] present now AND [3] sudden onset or new (e.g., minutes, hours, days)  (Exception: Seeing floaters / black specks OR previously diagnosed migraine headaches with same symptoms.)    Additional Information   Negative: Weakness of the face, arm or leg on one side of the body   Negative: Followed getting substance in the eye   Negative: Foreign body stuck in the eye   Negative: Followed an eye injury   Negative: Followed sun lamp or sun exposure (UV keratitis)   Negative: Yellow or green discharge (pus) in the eye   Negative: Pregnant   Negative: Postpartum (from 0 to 6 weeks after delivery)   Negative: Complete loss of vision in one or both eyes   Negative: SEVERE eye pain   Negative: SEVERE headache   Negative: Double vision    Protocols used: Vision Loss or Change-A-AH

## 2024-01-21 NOTE — ED PROVIDER NOTES
Problem: Oral Intake Inadequate  Goal: Improved Oral Intake  Outcome: Progressing   Goal Outcome Evaluation:       Pt now on a Regular diet. Intake progressing. Start Ensure Enlive daily until po is back to baseline.                   "  History     Chief Complaint   Patient presents with    Eye Problem     Sent from [facility], detached retina in right eye. Noticed vision changes starting Wednesday.     HPI  Terell Sebastian is a 73 year old male with a past medical history of asthma who presents to the emergency department with a chief complaint of vision changes.  The patient states that on Wednesday he began noticing \"raindrops\" across his vision.  Today, he noted that the bottom half to two thirds of his vision in his right eye was just black.  He was seen at [facility] emergency [facility] where a bedside ultrasound was done and the physician there was concerned that the patient had a retinal detachment.  He was transferred here to see ophthalmology.    I have reviewed the Medications, Allergies, Past Medical and Surgical History, and Social History in the [employer] system.    Past Medical History:   Diagnosis Date    Asthma      Past Surgical History:   Procedure Laterality Date    NO PAST SURGERIES      [place] LAP,INGUINAL HERNIA REPR,INITIAL Bilateral 1/24/2020    Procedure: Laparoscopic bilateral inguinal hernia repair;  Surgeon: Ricardo Valerio MD;  Location: [facility];  Service: General     No current facility-administered medications for this encounter.     Current Outpatient Medications   Medication    albuterol (PROVENTIL HFA;VENTOLIN HFA) 90 mcg/actuation inhaler    fluticasone (FLOVENT HFA) 44 MCG/ACT inhaler    fluticasone (FLOVENT HFA) 44 mcg/actuation inhaler    multivitamin with minerals (THERA-M) 9 mg iron-400 mcg Tab tablet    sildenafil (VIAGRA) 25 MG tablet    vitamin E 1000 UNIT capsule     No Known Allergies  Past medical history, past surgical history, medications, and allergies were reviewed with the patient. 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 Additional pertinent items: None    Review of Systems  A medically appropriate review of systems was performed with pertinent positives and negatives noted in the HPI, and all other systems negative.    Physical Exam   BP: (!) 158/89  Pulse: 59  Temp: 98  F (36.7  C)  Resp: 18  SpO2: 98 %      General: Well nourished, well developed, NAD  HEENT: EOMI, anicteric. NCAT, MMM  Neck: no jugular venous distension, supple, nl ROM  Cardiac: Regular rate, extremities well-perfused   Pulm: NLB, normal RR  Skin: Warm and dry to the touch.  No rash  Extremities: No LE edema, no cyanosis, w/w/p  Neuro: A&Ox3, no gross focal deficits    ED Course        Procedures                           Labs Ordered and Resulted from Time of ED Arrival to Time of ED Departure - No data to display         Results for orders placed or performed during the hospital encounter of 01/21/24 (from the past 24 hour(s))   INR   Result Value Ref Range    INR 1.04 0.85 - 1.15   Basic metabolic panel   Result Value Ref Range    Sodium 143 135 - 145 mmol/L    Potassium 4.2 3.4 - 5.3 mmol/L    Chloride 105 98 - 107 mmol/L    Carbon Dioxide (CO2) 28 22 - 29 mmol/L    Anion Gap 10 7 - 15 mmol/L    Urea Nitrogen 19.4 8.0 - 23.0 mg/dL    Creatinine 0.89 0.67 - 1.17 mg/dL    GFR  Estimate >90 >60 mL/min/1.73m2    Calcium 8.9 8.8 - 10.2 mg/dL    Glucose 93 70 - 99 mg/dL   CBC (+ platelets, no diff)   Result Value Ref Range    WBC Count 4.6 4.0 - 11.0 10e3/uL    RBC Count 4.51 4.40 - 5.90 10e6/uL    Hemoglobin 14.6 13.3 - 17.7 g/dL    Hematocrit 43.3 40.0 - 53.0 %    MCV 96 78 - 100 fL    MCH 32.4 26.5 - 33.0 pg    MCHC 33.7 31.5 - 36.5 g/dL    RDW 13.6 10.0 - 15.0 %    Platelet Count 223 150 - 450 10e3/uL   [place] Draw    Narrative    The following orders were created for panel order [place] Draw.  Procedure                               Abnormality         Status                     ---------                               -----------         ------                     Extra Red Top Tube[[id number]]                               Final result                 Please view results for these tests on the individual orders.   Extra Red Top Tube   Result Value Ref Range    Hold Specimen JI        Labs, vital signs, and imaging studies were reviewed by me.    Medications - No data to display    Assessments & Plan (with Medical Decision Making)   Terell Sebastian is a 73 year old male who presents to the emergency department with vision changes.  Ultrasound concerning for retinal detachment at outside ER.  Patient's vital signs are normal.  No other symptoms reported.  Ophthalmology was consulted and will come see the patient in the emergency department.    Critical care was not performed.     Medical Decision Making  The patient's presentation was of high complexity (an acute health issue posing potential threat to life or bodily function).    The patient's evaluation involved:  review of external note(s) from 1 sources (outside hospital ER)  review of 3+ test result(s) ordered prior to this encounter (labs from [facility] emergency department)  discussion of management or test interpretation with another health professional (ophthalmology)    The patient's management necessitated high risk (a decision  regarding emergency major procedure (operative intervention by ophthalmology)) and high risk (a decision regarding hospitalization).    I have reviewed the nursing notes.    I have reviewed the findings, diagnosis, plan and need for follow up with the patient.    Patient to be signed out to oncoming provider, awaiting ophthalmology recommendations at this time    New Prescriptions    No medications on file       Final diagnoses:   Vision changes       MISTY MENDOZA MD  1/21/2024   [facility] EMERGENCY DEPARTMENT       Misty Mendoza MD  01/21/24 7083

## 2024-01-22 ENCOUNTER — TELEPHONE (OUTPATIENT)
Dept: OPHTHALMOLOGY | Facility: CLINIC | Age: 74
End: 2024-01-22

## 2024-01-22 ENCOUNTER — OFFICE VISIT (OUTPATIENT)
Dept: OPHTHALMOLOGY | Facility: CLINIC | Age: 74
End: 2024-01-22
Attending: STUDENT IN AN ORGANIZED HEALTH CARE EDUCATION/TRAINING PROGRAM
Payer: COMMERCIAL

## 2024-01-22 DIAGNOSIS — H33.001 RHEGMATOGENOUS RETINAL DETACHMENT OF RIGHT EYE: ICD-10-CM

## 2024-01-22 DIAGNOSIS — H33.21 RIGHT RETINAL DETACHMENT: Primary | ICD-10-CM

## 2024-01-22 DIAGNOSIS — H04.122 DRY EYE OF LEFT SIDE: ICD-10-CM

## 2024-01-22 PROCEDURE — 99207 FUNDUS PHOTOS OU (BOTH EYES): CPT | Mod: 26 | Performed by: OPHTHALMOLOGY

## 2024-01-22 PROCEDURE — 99214 OFFICE O/P EST MOD 30 MIN: CPT | Performed by: OPHTHALMOLOGY

## 2024-01-22 PROCEDURE — 92250 FUNDUS PHOTOGRAPHY W/I&R: CPT | Performed by: OPHTHALMOLOGY

## 2024-01-22 PROCEDURE — 99214 OFFICE O/P EST MOD 30 MIN: CPT | Mod: GC | Performed by: OPHTHALMOLOGY

## 2024-01-22 PROCEDURE — 92134 CPTRZ OPH DX IMG PST SGM RTA: CPT | Performed by: OPHTHALMOLOGY

## 2024-01-22 PROCEDURE — 92134 CPTRZ OPH DX IMG PST SGM RTA: CPT | Mod: 26 | Performed by: OPHTHALMOLOGY

## 2024-01-22 RX ORDER — OMEGA-3/DHA/EPA/FISH OIL 60 MG-90MG
CAPSULE ORAL
COMMUNITY

## 2024-01-22 ASSESSMENT — VISUAL ACUITY
OS_PH_SC: 20/25
METHOD: SNELLEN - LINEAR
OS_SC: 20/40
OS_PH_SC+: -2
OD_SC+: -1
OD_SC: 20/125 ECC

## 2024-01-22 ASSESSMENT — TONOMETRY
OD_IOP_MMHG: 22
IOP_METHOD: TONOPEN
IOP_METHOD: TONOPEN
OS_IOP_MMHG: 29
OS_IOP_MMHG: 28

## 2024-01-22 ASSESSMENT — CONF VISUAL FIELD
OS_NORMAL: 1
OS_INFERIOR_TEMPORAL_RESTRICTION: 0
OD_INFERIOR_NASAL_RESTRICTION: 1
OD_SUPERIOR_TEMPORAL_RESTRICTION: 2
OS_INFERIOR_NASAL_RESTRICTION: 0
OD_SUPERIOR_NASAL_RESTRICTION: 2
OS_SUPERIOR_NASAL_RESTRICTION: 0
OS_SUPERIOR_TEMPORAL_RESTRICTION: 0
OD_INFERIOR_TEMPORAL_RESTRICTION: 1

## 2024-01-22 ASSESSMENT — CUP TO DISC RATIO
OD_RATIO: 0.1
OS_RATIO: 0.1

## 2024-01-22 ASSESSMENT — EXTERNAL EXAM - RIGHT EYE: OD_EXAM: NORMAL

## 2024-01-22 ASSESSMENT — SLIT LAMP EXAM - LIDS
COMMENTS: NORMAL
COMMENTS: NORMAL

## 2024-01-22 ASSESSMENT — EXTERNAL EXAM - LEFT EYE: OS_EXAM: NORMAL

## 2024-01-22 NOTE — PROGRESS NOTES
"  HPI       Retinal Detachment Evaluation    In right eye.  Characterized as lightning bolts.  Associated symptoms include Negative for floaters and eye pain.  Pain was noted as 0/10.             Comments    Terell is here new to clinic, after going to ED yesterday for vision changes. Here for evaluation of retinal detachment of right eye. He says last Wednesday, he noticed vision changes right eye. He does not wear glasses or contacts.     Jagdish Godinez COT 9:03 AM January 22, 2024             Last edited by Jagdish Godinez on 1/22/2024  9:03 AM.          Ophthalmology Acute Clinic     HPI:   Terell Sebastian is a 73 year old male who presents for 6 days of black dome in the right eye. Patient presented to ED 1/21/2024 and evaluated by Dr. Coon, found to have a macula-off retinal detachment.     He states that he noticed some floaters on Wednesday (1/17/24) but the floaters have since subsided. He denies any flashes. He denies any trauma.     Patient lives in [place]. Patient worked for [employer] at the [employer].     Past Ocular history:   - Medical history: ?Anisometropia. 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     Review of systems for the eyes was negative other than the pertinent positives/negatives listed in the HPI.      Imaging:     OCT Macula (01/22/24)    Right eye: Retinal detachment involving the macula  Left eye: No vitreous debris, normal foveal contour, no intra or subretinal hyper/hyporeflectivity    Fundus Photos (01/22/24)     Right eye: Mac off RD involving 10-2, horseshoe tear at 10 o'clock peripheral to the RD, chorioretinal scars inferotemporally     Left eye: Normal, no tears or holes     Assessment & Plan      Terell Sebastian is a 73 year old male with the following diagnoses:   1. 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 Dry eye of left side       Symptom onset on 1/17/2024 and reports symptoms have been stable in the last few days. Symptom notable for black dome in the inferior visual field. Denies any flashes or floaters. Exam notable for bello positive, horseshoe tear at 10 o'clock, and mac-off RD involving 10-2 clock hours. Patient phakic without cataract progression. Exam of left eye notable for clusters of PEE. Encouraged use of artificial tears.       Patient disposition:   Return for Surgery.    Patient seen with Dr. Manuel James MD  Resident Physician, PGY-2  Department of Ophthalmology  01/22/24 9:32 AM

## 2024-01-22 NOTE — PROGRESS NOTES
CC -   RD OD    INTERVAL HISTORY - Initial visit with me, referred from resident clinic for RD OD    PMH -   Terell Sebastian is a  73 year old year-old patient with history of mac-off RD OD, symptoms week prior, mac off since at least 1/20/24 by history, had floaters and flashes starting 1/17/24  No trauma    No DM, no HTn  natural monovision, OD was distance eye    PAST OCULAR SURGERY  None    RETINAL IMAGING:  OCT   OD - SRF macula with CME, PVD  OS - tr ERM, PVD      ASSESSMENT & PLAN    # RRD OD   - new Dx 1/22/24, mac off since 1/20/24 at least   - advise PPV/SBP this week     - r/b/a d/w patient: vision loss, blindness, infection, bleeding   - retinal detachment, need for more surgeries, need for gas or oil bubble and bubble restrictions   - cataract, diplopia, refractive change   - persistent blurriness, distortion, or scotoma   - participation/performance by fellow or resident      # PVD OU      # Lattice OU      # NS OU    # ONH drusen OS   - noted on FAF 1/22/24    # OHT OS > OD   -IOP 22 / 28 on 1/22/24   - CDR OK    return to clinic: POD #1    ATTESTATION     Attending Attestation:     Complete documentation of historical and exam elements from today's encounter can be found in the full encounter summary report (not reduplicated in this progress note).  I personally obtained the chief complaint(s) and history of present illness.  I confirmed and edited as necessary the review of systems, past medical/surgical history, family history, social history, and examination findings as documented by others; and I examined the patient myself.  I personally reviewed the relevant tests, images, and reports as documented above.  I formulated and edited as necessary the assessment and plan and discussed the findings and management plan with the patient and family    Yue Jackson MD, PhD  , Vitreoretinal Surgery  Department of Ophthalmology  [facility]

## 2024-01-22 NOTE — TELEPHONE ENCOUNTER
Patient is schedule for surgery with: Dr. Jackson    Surgery Date: 1/25     Location: [facility]    H&P: to be completed by Eye Resident (per Dr. Jackson)     Post-op: 1/26, 2/2, 2/23    Patient will receive a phone call from pre-admission nurses 3-5 days prior to surgery with arrival time and NPO instructions.    Patient aware times are subject to change up until day before surgery.     Patient questions/concerns: N/A     Surgery packet was sent via [facility] (patient stated they would try to get into [facility])      Geneva Tsang on 1/22/2024 at 12:41 PM

## 2024-01-22 NOTE — NURSING NOTE
Chief Complaints and History of Present Illnesses   Patient presents with    Retinal Detachment Evaluation     Chief Complaint(s) and History of Present Illness(es)       Retinal Detachment Evaluation              Laterality: right eye    Quality: lightning bolts    Associated symptoms: Negative for floaters and eye pain    Pain scale: 0/10              Comments    Terell is here new to clinic, after going to ED yesterday for vision changes. Here for evaluation of retinal detachment of right eye. He says last Wednesday, he noticed vision changes right eye. He does not wear glasses or contacts.     Jagdish Godinez COT 9:03 AM January 22, 2024

## 2024-01-24 ENCOUNTER — ANESTHESIA EVENT (OUTPATIENT)
Dept: SURGERY | Facility: AMBULATORY SURGERY CENTER | Age: 74
End: 2024-01-24
Payer: COMMERCIAL

## 2024-01-25 ENCOUNTER — HOSPITAL ENCOUNTER (OUTPATIENT)
Facility: AMBULATORY SURGERY CENTER | Age: 74
Discharge: HOME | End: 2024-01-25
Attending: OPHTHALMOLOGY
Payer: COMMERCIAL

## 2024-01-25 ENCOUNTER — ANESTHESIA (OUTPATIENT)
Dept: SURGERY | Facility: AMBULATORY SURGERY CENTER | Age: 74
End: 2024-01-25
Payer: COMMERCIAL

## 2024-01-25 VITALS
HEIGHT: 68 IN | WEIGHT: 130 LBS | HEART RATE: 63 BPM | TEMPERATURE: 97 F | OXYGEN SATURATION: 100 % | SYSTOLIC BLOOD PRESSURE: 132 MMHG | BODY MASS INDEX: 19.7 KG/M2 | RESPIRATION RATE: 16 BRPM | DIASTOLIC BLOOD PRESSURE: 76 MMHG

## 2024-01-25 DIAGNOSIS — H33.21 RIGHT RETINAL DETACHMENT: ICD-10-CM

## 2024-01-25 DIAGNOSIS — Z48.810 AFTERCARE FOLLOWING SURGERY OF A SENSE ORGAN: Primary | ICD-10-CM

## 2024-01-25 PROCEDURE — 67108 REPAIR DETACHED RETINA: CPT | Mod: RT

## 2024-01-25 PROCEDURE — 67108 REPAIR DETACHED RETINA: CPT | Mod: RT | Performed by: OPHTHALMOLOGY

## 2024-01-25 DEVICE — EYE IMP STRIP STYLE 4050 S4050: Type: IMPLANTABLE DEVICE | Site: EYE | Status: FUNCTIONAL

## 2024-01-25 DEVICE — EYE IMP SLEEVE OVAL STYLE 3084 S3084: Type: IMPLANTABLE DEVICE | Site: EYE | Status: FUNCTIONAL

## 2024-01-25 RX ORDER — DEXAMETHASONE SODIUM PHOSPHATE 4 MG/ML
INJECTION, SOLUTION INTRA-ARTICULAR; INTRALESIONAL; INTRAMUSCULAR; INTRAVENOUS; SOFT TISSUE PRN
Status: DISCONTINUED | OUTPATIENT
Start: 2024-01-25 | End: 2024-01-25 | Stop reason: HOSPADM

## 2024-01-25 RX ORDER — BALANCED SALT SOLUTION 6.4; .75; .48; .3; 3.9; 1.7 MG/ML; MG/ML; MG/ML; MG/ML; MG/ML; MG/ML
SOLUTION OPHTHALMIC PRN
Status: DISCONTINUED | OUTPATIENT
Start: 2024-01-25 | End: 2024-01-25 | Stop reason: HOSPADM

## 2024-01-25 RX ORDER — CYCLOPENTOLAT/TROPIC/PHENYLEPH 1%-1%-2.5%
1 DROPS (EA) OPHTHALMIC (EYE)
Status: COMPLETED | OUTPATIENT
Start: 2024-01-25 | End: 2024-01-25

## 2024-01-25 RX ORDER — OFLOXACIN 3 MG/ML
1 SOLUTION/ DROPS OPHTHALMIC 4 TIMES DAILY
Qty: 5 ML | Refills: 0 | Status: SHIPPED | OUTPATIENT
Start: 2024-01-25 | End: 2024-05-09

## 2024-01-25 RX ORDER — PREDNISOLONE ACETATE 10 MG/ML
1 SUSPENSION/ DROPS OPHTHALMIC 4 TIMES DAILY
Qty: 5 ML | Refills: 1 | Status: SHIPPED | OUTPATIENT
Start: 2024-01-25 | End: 2024-02-02

## 2024-01-25 RX ORDER — SODIUM CHLORIDE, SODIUM LACTATE, POTASSIUM CHLORIDE, CALCIUM CHLORIDE 600; 310; 30; 20 MG/100ML; MG/100ML; MG/100ML; MG/100ML
INJECTION, SOLUTION INTRAVENOUS CONTINUOUS
Status: DISCONTINUED | OUTPATIENT
Start: 2024-01-25 | End: 2024-01-26 | Stop reason: HOSPADM

## 2024-01-25 RX ORDER — HYDRALAZINE HYDROCHLORIDE 20 MG/ML
INJECTION INTRAMUSCULAR; INTRAVENOUS PRN
Status: DISCONTINUED | OUTPATIENT
Start: 2024-01-25 | End: 2024-01-25

## 2024-01-25 RX ORDER — ERYTHROMYCIN 5 MG/G
OINTMENT OPHTHALMIC PRN
Status: DISCONTINUED | OUTPATIENT
Start: 2024-01-25 | End: 2024-01-25 | Stop reason: HOSPADM

## 2024-01-25 RX ORDER — POLYMYXIN B SULFATE AND TRIMETHOPRIM 1; 10000 MG/ML; [USP'U]/ML
SOLUTION OPHTHALMIC PRN
Status: DISCONTINUED | OUTPATIENT
Start: 2024-01-25 | End: 2024-01-25 | Stop reason: HOSPADM

## 2024-01-25 RX ORDER — ACETAMINOPHEN 325 MG/1
975 TABLET ORAL ONCE
Status: COMPLETED | OUTPATIENT
Start: 2024-01-25 | End: 2024-01-25

## 2024-01-25 RX ORDER — LIDOCAINE 40 MG/G
CREAM TOPICAL
Status: DISCONTINUED | OUTPATIENT
Start: 2024-01-25 | End: 2024-01-26 | Stop reason: HOSPADM

## 2024-01-25 RX ORDER — PROPARACAINE HYDROCHLORIDE 5 MG/ML
1 SOLUTION/ DROPS OPHTHALMIC ONCE
Status: COMPLETED | OUTPATIENT
Start: 2024-01-25 | End: 2024-01-25

## 2024-01-25 RX ORDER — ATROPINE SULFATE 10 MG/ML
SOLUTION/ DROPS OPHTHALMIC PRN
Status: DISCONTINUED | OUTPATIENT
Start: 2024-01-25 | End: 2024-01-25 | Stop reason: HOSPADM

## 2024-01-25 RX ORDER — OXYCODONE HYDROCHLORIDE 5 MG/1
5 TABLET ORAL
Status: DISCONTINUED | OUTPATIENT
Start: 2024-01-25 | End: 2024-01-26 | Stop reason: HOSPADM

## 2024-01-25 RX ORDER — ONDANSETRON 2 MG/ML
4 INJECTION INTRAMUSCULAR; INTRAVENOUS EVERY 30 MIN PRN
Status: DISCONTINUED | OUTPATIENT
Start: 2024-01-25 | End: 2024-01-26 | Stop reason: HOSPADM

## 2024-01-25 RX ORDER — PROPOFOL 10 MG/ML
INJECTION, EMULSION INTRAVENOUS PRN
Status: DISCONTINUED | OUTPATIENT
Start: 2024-01-25 | End: 2024-01-25

## 2024-01-25 RX ORDER — LIDOCAINE HYDROCHLORIDE 20 MG/ML
INJECTION, SOLUTION INFILTRATION; PERINEURAL PRN
Status: DISCONTINUED | OUTPATIENT
Start: 2024-01-25 | End: 2024-01-25

## 2024-01-25 RX ORDER — OXYCODONE HYDROCHLORIDE 5 MG/1
10 TABLET ORAL
Status: DISCONTINUED | OUTPATIENT
Start: 2024-01-25 | End: 2024-01-26 | Stop reason: HOSPADM

## 2024-01-25 RX ORDER — TETRACAINE HYDROCHLORIDE 5 MG/ML
SOLUTION OPHTHALMIC PRN
Status: DISCONTINUED | OUTPATIENT
Start: 2024-01-25 | End: 2024-01-25 | Stop reason: HOSPADM

## 2024-01-25 RX ORDER — ONDANSETRON 4 MG/1
4 TABLET, ORALLY DISINTEGRATING ORAL EVERY 30 MIN PRN
Status: DISCONTINUED | OUTPATIENT
Start: 2024-01-25 | End: 2024-01-26 | Stop reason: HOSPADM

## 2024-01-25 RX ADMIN — ACETAMINOPHEN 975 MG: 325 TABLET ORAL at 10:23

## 2024-01-25 RX ADMIN — HYDRALAZINE HYDROCHLORIDE 10 MG: 20 INJECTION INTRAMUSCULAR; INTRAVENOUS at 13:09

## 2024-01-25 RX ADMIN — PROPARACAINE HYDROCHLORIDE 1 DROP: 5 SOLUTION/ DROPS OPHTHALMIC at 10:19

## 2024-01-25 RX ADMIN — LIDOCAINE HYDROCHLORIDE 60 MG: 20 INJECTION, SOLUTION INFILTRATION; PERINEURAL at 11:56

## 2024-01-25 RX ADMIN — PROPOFOL 30 MG: 10 INJECTION, EMULSION INTRAVENOUS at 11:57

## 2024-01-25 RX ADMIN — PROPOFOL 60 MG: 10 INJECTION, EMULSION INTRAVENOUS at 11:56

## 2024-01-25 RX ADMIN — SODIUM CHLORIDE, SODIUM LACTATE, POTASSIUM CHLORIDE, CALCIUM CHLORIDE: 600; 310; 30; 20 INJECTION, SOLUTION INTRAVENOUS at 10:21

## 2024-01-25 RX ADMIN — Medication 1 DROP: at 10:34

## 2024-01-25 RX ADMIN — Medication 1 DROP: at 10:26

## 2024-01-25 RX ADMIN — Medication 1 DROP: at 10:20

## 2024-01-25 ASSESSMENT — COPD QUESTIONNAIRES: COPD: 0

## 2024-01-25 ASSESSMENT — ENCOUNTER SYMPTOMS: ORTHOPNEA: 0

## 2024-01-25 ASSESSMENT — LIFESTYLE VARIABLES: TOBACCO_USE: 0

## 2024-01-25 NOTE — ANESTHESIA PREPROCEDURE EVALUATION
Anesthesia Pre-Procedure Evaluation    Patient: Terell Sebastian   MRN: [id number] : 1950        Procedure : Procedure(s):  Right eye: vitrectomy, scleral buckle, gas bubble, endolaser          Past Medical History:   Diagnosis Date    Asthma       Past Surgical History:   Procedure Laterality Date    NO PAST SURGERIES      DE LAP,INGUINAL HERNIA REPR,INITIAL Bilateral 2020    Procedure: Laparoscopic bilateral inguinal hernia repair;  Surgeon: Ricardo Valerio MD;  Location: [facility];  Service: General      No Known Allergies   Social History     Tobacco Use    Smoking status: Never    Smokeless tobacco: Never   Substance Use Topics    Alcohol use: Yes     Comment: occasional      Wt Readings from Last 1 Encounters:   24 59 kg (130 lb)        Anesthesia Evaluation   Pt has had prior anesthetic. Type: General.    No history of anesthetic complications       ROS/MED HX  ENT/Pulmonary:     (+)                      asthma (More related to allergies)  Treatment: Inhaler prn,              (-) tobacco use, COPD and recent URI   Neurologic:       Cardiovascular:    (-) HENRY, orthopnea/PND and syncope   METS/Exercise Tolerance: >4 METS Comment: Stationary bike, walking without concerning exertional symptoms   Hematologic:       Musculoskeletal:       GI/Hepatic:    (-) GERD   Renal/Genitourinary:       Endo:       Psychiatric/Substance Use:       Infectious Disease:       Malignancy:       Other:            Physical Exam    Airway        Mallampati: II   TM distance: > 3 FB   Neck ROM: full   Mouth opening: > 3 cm    Respiratory Devices and Support         Dental         B=Bridge, C=Chipped, L=Loose, M=Missing    Cardiovascular          Rhythm and rate: regular and normal     Pulmonary           breath sounds clear to auscultation           OUTSIDE LABS:  CBC:   Lab Results   Component Value Date    WBC 4.6 2024    WBC 3.9 (L) 2020    HGB 14.6 2024    HGB 14.8 2020    HCT  "43.3 01/21/2024    HCT 45.0 01/21/2020     01/21/2024     01/21/2020     BMP:   Lab Results   Component Value Date     01/21/2024     01/21/2020    POTASSIUM 4.2 01/21/2024    POTASSIUM 4.5 01/21/2020    CHLORIDE 105 01/21/2024    CHLORIDE 102 01/21/2020    CO2 28 01/21/2024    CO2 28 01/21/2020    BUN 19.4 01/21/2024    BUN 15 01/21/2020    CR 0.89 01/21/2024    CR 0.82 01/21/2020    GLC 93 01/21/2024    GLC 82 01/21/2020     COAGS:   Lab Results   Component Value Date    INR 1.04 01/21/2024     POC: No results found for: \"BGM\", \"HCG\", \"HCGS\"  HEPATIC: No results found for: \"ALBUMIN\", \"PROTTOTAL\", \"ALT\", \"AST\", \"GGT\", \"ALKPHOS\", \"BILITOTAL\", \"BILIDIRECT\", \"BONNIE\"  OTHER:   Lab Results   Component Value Date    LYNDSEY 8.9 01/21/2024       Anesthesia Plan    ASA Status:  2    NPO Status:  NPO Appropriate    Anesthesia Type: MAC.     - Reason for MAC: straight local not clinically adequate              Consents    Anesthesia Plan(s) and associated risks, benefits, and realistic alternatives discussed. Questions answered and patient/representative(s) expressed understanding.     - Discussed:     - Discussed with:  Patient            Postoperative Care            Comments:    Other Comments: Discussed plan for MAC, including likelihood of awareness, risk of aspiration pneumonia, risk of hypoxia/low oxygen/airway obstruction requiring additional airway support/devices. Discussed alternatives. Discussed backup plan of general anesthesia and risks of general anesthesia, including sore throat/hoarse voice, abrasions/damage to lips/tongue/teeth, nausea, rare complications (including medication reactions, cardiac, pulmonary). Ensured understanding, invited questions and all questions were answered.               Ketty Griggs MD    I have reviewed the pertinent notes and labs in the chart from the past 30 days and (re)examined the patient.  Any updates or changes from those notes are reflected in this " note.

## 2024-01-25 NOTE — OP NOTE
PRE-OP Dx:   1) RRD RIGHT eye      Post-OP Dx:   1) same    Attending:  JUANY Jackson MD, PhD  Fellow:   AKIL Burgos MD, MPH      Anesthesia:  MAC + RB  Procedure (ALL OD):    1) Pars plana vitrectomy (PPV) 25g   2) SBP #4050 & 3084   3) FGx 20% SF6      Findings: RD with breaks at 10:00 and 12:00, suspicious region at 7:30    EBL: scant  Specimens: none  Complications: none      Procedure Description:     Terell Sebastian is a AGE: 73 year old patient with a history of RRD OD.  After informed consent was obtained, he was brought into the OR where MAC+RB anesthesia was administered.  The eye was then prepped and draped in the usual fashion for ophthalmic surgery.    A 360 degree conjunctival peritomy was created and the quadrants cleared with the zaragoza scissors.  The muscles were isolated and looped with 2-0 silk sutures.  Next 5-0 nylon suture was used to create horizontal mattress sutures in each quadrant.  A #4050 band  was then threaded under the muscles and the mattress sutures and secured with a #3084 sleeve in the superonasal quadrant.   The mattress sutures were tied except for the one over the sleeve.    Attention was then turned to the vitrectomy.  Marks were made on the sclera inferotemporally, superotemporally, and superonasally 3.5 mm posterior to the limbus.  The 25g transscleral cannulas were inserted through the sclera using the trocars.  The infusion cannula was connected to the inferonasal cannula and directly visualized to verify it was in the correct location.  A core vitrectomy was performed and the vitreous was stained with kenalog.  The periphery was trimmed with depression.  A retinal  break  was found at 10:00 and at 12:00. A suspicious region was seen at 7:30.  Traction was removed and all breaks were marked with diathermy.      A drainage retinotomy was created at 10:00 posterior to the buckle. Next Fluid-Air exchange was done. Laser was placed around all marked breaks and the drainage  retinotomy.      The final buckle sutures were tied and the ends trimmed.  The buckle height was verified to be appropriate.       An air-gas exchange was done with 20% SF6 gas and the cannulas were removed.  The sclerotomies were sutured as needed and were tight.  The pressure was checked and verified to be appropriate.  The buckle was rinsed with polytrim solution and the silk sutures removed.  The conjunctiva was closed with 6-0 plain suture.  A drop of atropine was placed in the eye with Maxitrol ointment.  A pad and garcia shield were taped over the eye.    The surgery was assisted by Dr. Jose Burgos, because no qualified resident was available on the day of the surgery. Due to the delicate and complex nature of this surgery, Dr. Burgos was required. Dr. Burgos assisted with the buckle, vitrectomy, laser, and retinal detachment repair. I was present for the entire surgery.    Yue Jackson MD, PhD

## 2024-01-25 NOTE — DISCHARGE INSTRUCTIONS
POST-OPERATIVE INSTRUCTIONS FOLLOWING RETINA SURGERY    Yue Jackson MD, PhD  Department of Ophthalmology  [facility]  [phone]      ACTIVITY:  No heavy lifting after surgery  Keep the bandage in place until you are seen tomorrow at the Eye Clinic in the [facility] ([facility]), on the 9th floor.  The clinic street address for [facility] is [address] [place] [address], [place] [address]  The [facility] patient parking garage is 1 block away at [address].  Do not get your bandage wet.      GAS BUBBLE POSITIONING REQUIREMENTS  Positioning requirements will be discussed with you if you have an oil or gas bubble in your eye:    Position:    Face Down    Maintain this positioning for 3 days (this will be confirmed at the clinic visit tomorrow).    Sleeping face down can be challenging.  One method is to sleep on your stomach with your head hanging over the edge of the bed with a chair there to rest your head on.  Alternatively, you can sleep with your chest laying on top of a large pile of pillows with your head hanging over.  Alternatively, you can place 2 rows of firm pillows side by side with a gap in between.  Sleep on top of the pillows with your head in the gap.  Alternatively, it is also possible to rent positioning equipment from medical supply stores. This typically costs $150-200 per week. Insurance usually does not cover the cost.  Often, I have found patients prefer the pillows they set up themselves to the rented equipment.    When positioning, it is OK to take brief breaks to eat, stretch, clean up, etc.  Try to position for 90% of the time (5 minute break per hour on average).  Do not lay flat on your back until the bubble is gone.  Do not fly on an airplane or travel to elevations more than 1000 feet above [place] until the bubble is gone.  Keep the green bracelet on your wrist until the bubble is gone.  If it breaks, we can give you a replacement        EYE DROPS  Drops  will be given to you after the surgery.  They DO NOT need to be used until after you are seen in clinic.  DO NOT disturb your bandage the first night.      When using more than one drop, separate them by 3 minutes between drops.  Common times to place drops are breakfast, lunch, dinner, and bedtime.  Do not stop your drops without discussing with our office.  If you run out before your appointment, call and we will send in a refill.    ofloxacin --- 4 times per day  Pred Forte (prednisolone acetate) --- 4 times per day      PAIN MEDICATION   It is common to have some mild or moderate discomfort after eye surgery.  Tylenol or ibuprofen may be taken if you don't have any other general health conditions that prevent you from taking these.      WHAT TO EXPECT  It is common for the eye to to have a blood tinged discharge for a few days after surgery  It may feel irritated (as if something were in your eye), for there to be clear discharge (thicker in the mornings upon awakening), and for it to be bloodshot for 2-3 weeks following retina surgery.   Your vision is also commonly decreased during this time due to the bubble.          WHAT TO WATCH OUT FOR  If you experience any of the following, you should call immediately:  Increasing pain  Increasing nausea or vomiting  Increasing redness  Worsening or darkening of the vision  New flashing lights or floaters      For any of the symptoms listed above, or for other concerns, call [phone] and ask to speak to the clinic nurse.  If you call after hours, follow to prompts to reach the doctor on call.      [facility]  Home Care Following Anesthesia  For 24 hours after surgery:  Get plenty of rest.  A responsible adult must stay with you for at least 24 hours after you leave the surgery center.  Do not drive or use heavy equipment.  If you have weakness or tingling, don't drive or use heavy equipment until this feeling goes away.   Do  not drink alcohol.   Avoid strenuous or risky activities.  Ask for help when climbing stairs.  You may feel lightheaded.  IF so, sit for a few minutes before standing.  Have someone help you get up.   If you have nausea (feel sick to your stomach): Drink only clear liquids such as apple juice, ginger ale, broth or 7-Up.  Rest may also help.  Be sure to drink enough fluids.  Move to a regular diet as you feel able.   You may have a slight fever.  Call the doctor if your fever is over 100 F (37.7 C) (taken under the tongue) or lasts longer than 24 hours.  You may have a dry mouth, a sore throat, muscle aches or trouble sleeping. These should go away after 24 hours.  Do not make important or legal decisions.   It is recommended to avoid smoking.               Tips for taking pain medications  To get the best pain relief possible, remember these points:  Take pain medications as directed, before pain becomes severe.  Pain medication can upset your stomach: taking it with food may help.  Constipation is a common side effect of pain medication. Drink plenty of  fluids.  Eat foods high in fiber. Take a stool softener if recommended by your doctor or pharmacist.  Do not drink alcohol, drive or operate machinery while taking pain medications.  Ask about other ways to control pain, such as with heat, ice or relaxation.    Tylenol/Acetaminophen Consumption    If you feel your pain relief is insufficient, you may take Tylenol/Acetaminophen in addition to your narcotic pain medication.   Be careful not to exceed 4,000 mg of Tylenol/Acetaminophen in a 24 hour period from all sources.  If you are taking extra strength Tylenol/acetaminophen (500 mg), the maximum dose is 8 tablets in 24 hours.  If you are taking regular strength acetaminophen (325 mg), the maximum dose is 12 tablets in 24 hours.    Call a doctor for any of the following:  Signs of infection (fever, growing tenderness at the surgery site, a large amount of drainage  or bleeding, severe pain, foul-smelling drainage, redness, swelling).  It has been over 8 to 10 hours since surgery and you are still not able to urinate (pass water).  Headache for over 24 hours.  Numbness, tingling or weakness the day after surgery (if you had spinal anesthesia).  Signs of Covid-19 infection (temperature over 100 degrees, shortness of breath, cough, loss of taste/smell, generalized body aches, persistent headache, chills, sore throat, nausea/vomiting/diarrhea)  Your doctor is:  Dr. Yue Jackson: Opthalmology: [phone]                    Or dial [phone] and ask for the resident on call for:  Ophthalmology  For emergency care, call the:  [facility]:  [phone] (TTY for hearing impaired: [phone])

## 2024-01-25 NOTE — BRIEF OP NOTE
[facility]    Brief Operative Note    Pre-operative diagnosis: Right retinal detachment [H33.21]  Post-operative diagnosis Same as pre-operative diagnosis    Procedure: RIGHT EYE 25G PARSPLANA VITRECTOMY, SCLERAL BUCKLE, ENDOLASER, AIR FLUID EXCHANGE, INFUSION OF 20% SF6 GAS, retinal detachment repair, Right - Eye    Surgeon: Surgeon(s) and Role:     * Yue Jackson MD - Primary     * Jose Burgos MD - Fellow - Assisting  Anesthesia: MAC with Retrobulbar   Estimated Blood Loss: Minimal    Drains: None  Specimens: * No specimens in log *  Findings:   None.  Complications: None.  Implants:   Implant Name Type Inv. Item Serial No.  Lot No. LRB No. Used Action   EYE IMP SLEEVE OVAL STYLE 3084  - [id number] Lens/Eye Implant EYE IMP SLEEVE OVAL STYLE 3084   [employer] [id number] Right 1 Implanted   EYE IMP STRIP STYLE 4050  - [id number] Lens/Eye Implant EYE IMP STRIP STYLE 4050   [employer] [id number] Right 1 Implanted

## 2024-01-25 NOTE — ANESTHESIA POSTPROCEDURE EVALUATION
Patient: Terell Sebastian    Procedure: Procedure(s):  RIGHT EYE 25G PARSPLANA VITRECTOMY, SCLERAL BUCKLE, ENDOLASER, AIR FLUID EXCHANGE, INFUSION OF 20% SF6 GAS, retinal detachment repair       Anesthesia Type:  MAC    Note:  Disposition: Outpatient   Postop Pain Control: Uneventful            Sign Out: Well controlled pain   PONV: No   Neuro/Psych: Uneventful            Sign Out: Acceptable/Baseline neuro status   Airway/Respiratory: Uneventful            Sign Out: Acceptable/Baseline resp. status   CV/Hemodynamics: Uneventful            Sign Out: Acceptable CV status; No obvious hypovolemia; No obvious fluid overload   Other NRE:    DID A NON-ROUTINE EVENT OCCUR?            Last vitals:  Vitals Value Taken Time   /78 01/25/24 1353   Temp 36.1  C (97  F) 01/25/24 1353   Pulse     Resp 16 01/25/24 1353   SpO2 100 % 01/25/24 1353       Electronically Signed By: Ketty Griggs MD  January 25, 2024  1:58 PM

## 2024-01-26 ENCOUNTER — OFFICE VISIT (OUTPATIENT)
Dept: OPHTHALMOLOGY | Facility: CLINIC | Age: 74
End: 2024-01-26
Attending: OPHTHALMOLOGY
Payer: COMMERCIAL

## 2024-01-26 DIAGNOSIS — Z98.890 POST-OPERATIVE STATE: Primary | ICD-10-CM

## 2024-01-26 PROCEDURE — 99213 OFFICE O/P EST LOW 20 MIN: CPT | Performed by: OPHTHALMOLOGY

## 2024-01-26 PROCEDURE — 99024 POSTOP FOLLOW-UP VISIT: CPT | Performed by: OPHTHALMOLOGY

## 2024-01-26 ASSESSMENT — VISUAL ACUITY
OD_SC: HM
METHOD: SNELLEN - LINEAR

## 2024-01-26 ASSESSMENT — SLIT LAMP EXAM - LIDS: COMMENTS: NORMAL

## 2024-01-26 ASSESSMENT — TONOMETRY
OD_IOP_MMHG: 18
OS_IOP_MMHG: 27
IOP_METHOD: TONOPEN

## 2024-01-26 ASSESSMENT — EXTERNAL EXAM - RIGHT EYE: OD_EXAM: NORMAL

## 2024-01-26 NOTE — PROGRESS NOTES
CC -   Post Op OD    INTERVAL HISTORY - POD #1 s/p PPV/SBP/SF6 OD 1/25/24    PMH -   Terell Sebastian is a  73 year old year-old patient with history of mac-off RD OD, symptoms week prior, mac off since at least 1/20/24 by history, had floaters and flashes starting 1/17/24  No trauma    No DM, no HTn  natural monovision, OD was distance eye    PAST OCULAR SURGERY  PPV/SBP/SF6 OD 1/25/24    RETINAL IMAGING:  OCT   OD - SRF macula with CME, PVD  OS - tr ERM, PVD      ASSESSMENT & PLAN    # POD #1 OD   - mac off RRD repair PPV/SBP/SF6 1/25/24   - IOP OK, retina flat, no infection     - PF/oflox   - face down until tomorrow then upright daytime and sleep LHS down   - f/u 1 week      # PVD OS      # Lattice OU      # NS OU    # ONH drusen OS   - noted on FAF 1/22/24    # OHT OS > OD   -IOP 22 / 28 on 1/22/24   - CDR OK    return to clinic: POD #1    ATTESTATION     Attending Attestation:     Complete documentation of historical and exam elements from today's encounter can be found in the full encounter summary report (not reduplicated in this progress note).  I personally obtained the chief complaint(s) and history of present illness.  I confirmed and edited as necessary the review of systems, past medical/surgical history, family history, social history, and examination findings as documented by others; and I examined the patient myself.  I personally reviewed the relevant tests, images, and reports as documented above.  I formulated and edited as necessary the assessment and plan and discussed the findings and management plan with the patient and family    Yue Jackson MD, PhD  , Vitreoretinal Surgery  Department of Ophthal[facility]  [facility]

## 2024-01-26 NOTE — PATIENT INSTRUCTIONS
POST-OPERATIVE INSTRUCTIONS FOLLOWING RETINA SURGERY    Yue Jackson MD, PhD  Department of Ophthalmology  [facility]  [phone]      ACTIVITY:  No heavy lifting for 2 weeks after surgery.  No swimming for 3 weeks after surgery.  It is OK for you to shower, to wash your face, and to wash your hair.  Allow the shower to hit the top of your head and wash down your face.  Do not hit your eye directly with the jet from the shower.  Keep your eye covered with the shield when you sleep for 1 week after surgery.  If your shield has a tab, it is designed to go over the bridge of your nose.  Place one piece of tape diagonally from the center of your forehead to the side of your cheek to secure the shield.   During the daytime, you can use either the shield or your regular eyeglasses or sunglasses to protect your eye.    GAS BUBBLE POSITIONING REQUIREMENTS  Positioning requirements will be discussed with you if you have an oil or gas bubble in your eye:    Position:    Face Down until Saturday morning, then  Upright daytime, sleep on left hand side  .    When positioning, it is OK to take brief breaks to eat, stretch, clean up, etc.  Try to position for 90% of the time (5 minute break per hour on average).  Do not lay flat on your back until the bubble is gone.  Do not fly on an airplane or travel to elevations more than 1000 feet above [place] until the bubble is gone.  Keep the green bracelet on your wrist until the bubble is gone.  If it breaks, we can give you a replacement      EYE DROPS  Use these drops after surgery.  When using more than one drop, separate them by 3 minutes between drops.  Common times to place drops are breakfast, lunch, dinner, and bedtime.  Do not stop your drops without discussing with our office.  If you run out before your appointment, call and we will send in a refill.      Ofloxacin --- 4 times per day  Pred Forte (prednisolone acetate) --- 4 times per day    WHAT TO  EXPECT  It is common for the eye to to have a blood tinged discharge for a few days after surgery  It may feel irritated (as if something were in your eye), for there to be clear discharge (thicker in the mornings upon awakening), and for it to be bloodshot for 2-3 weeks following retina surgery.   Your vision is also commonly decreased during this time due to the bubble.          WHAT TO WATCH OUT FOR  If you experience any of the following, you should call immediately:  Increasing pain  Increasing nausea or vomiting  Increasing redness  Worsening or darkening of the vision  New flashing lights or floaters      For any of the symptoms listed above, or for other concerns, call [phone] and ask to speak to the clinic nurse.  If you call after hours, follow to prompts to reach the doctor on call.

## 2024-01-26 NOTE — NURSING NOTE
Chief Complaints and History of Present Illnesses   Patient presents with    Post Op (Ophthalmology) Right Eye     RIGHT EYE 25G PARSPLANA VITRECTOMY, SCLERAL BUCKLE, ENDOLASER, AIR FLUID EXCHANGE, INFUSION OF 20% SF6 GAS, retinal detachment repair 1/25/24     Chief Complaint(s) and History of Present Illness(es)       Post Op (Ophthalmology) Right Eye              Comments: RIGHT EYE 25G PARSPLANA VITRECTOMY, SCLERAL BUCKLE, ENDOLASER, AIR FLUID EXCHANGE, INFUSION OF 20% SF6 GAS, retinal detachment repair 1/25/24              Comments    Day 1 PO  Pt doing well  No eye pain    Yudith Guo COT 7:28 AM January 26, 2024

## 2024-02-02 ENCOUNTER — OFFICE VISIT (OUTPATIENT)
Dept: OPHTHALMOLOGY | Facility: CLINIC | Age: 74
End: 2024-02-02
Attending: OPHTHALMOLOGY
Payer: COMMERCIAL

## 2024-02-02 DIAGNOSIS — Z48.810 AFTERCARE FOLLOWING SURGERY OF A SENSE ORGAN: ICD-10-CM

## 2024-02-02 DIAGNOSIS — H33.21 RIGHT RETINAL DETACHMENT: Primary | ICD-10-CM

## 2024-02-02 DIAGNOSIS — Z98.890 POST-OPERATIVE STATE: ICD-10-CM

## 2024-02-02 PROCEDURE — 99024 POSTOP FOLLOW-UP VISIT: CPT | Mod: GC | Performed by: OPHTHALMOLOGY

## 2024-02-02 PROCEDURE — 92134 CPTRZ OPH DX IMG PST SGM RTA: CPT | Performed by: OPHTHALMOLOGY

## 2024-02-02 PROCEDURE — 99213 OFFICE O/P EST LOW 20 MIN: CPT | Performed by: OPHTHALMOLOGY

## 2024-02-02 PROCEDURE — 99207 OCT RETINA SPECTRALIS OD (RIGHT EYE): CPT | Mod: 26 | Performed by: OPHTHALMOLOGY

## 2024-02-02 RX ORDER — PREDNISOLONE ACETATE 10 MG/ML
1 SUSPENSION/ DROPS OPHTHALMIC 2 TIMES DAILY
Qty: 5 ML | Refills: 1 | Status: SHIPPED | OUTPATIENT
Start: 2024-02-02 | End: 2024-05-09

## 2024-02-02 ASSESSMENT — TONOMETRY
OS_IOP_MMHG: 24
OS_IOP_MMHG: 34
IOP_METHOD: APPLANATION
IOP_METHOD: TONOPEN
OS_IOP_MMHG: 31
IOP_METHOD: APPLANATION
IOP_METHOD: TONOPEN
OS_IOP_MMHG: 23
OD_IOP_MMHG: 18

## 2024-02-02 ASSESSMENT — VISUAL ACUITY
OS_PH_SC+: -2
OS_SC+: -1
METHOD: SNELLEN - LINEAR
OS_PH_SC: 20/25
OD_SC+: -1
OS_SC: 20/40
OD_SC: 20/100

## 2024-02-02 ASSESSMENT — CUP TO DISC RATIO: OD_RATIO: SMALL

## 2024-02-02 ASSESSMENT — SLIT LAMP EXAM - LIDS
COMMENTS: NORMAL
COMMENTS: NORMAL

## 2024-02-02 ASSESSMENT — EXTERNAL EXAM - RIGHT EYE: OD_EXAM: NORMAL

## 2024-02-02 NOTE — PATIENT INSTRUCTIONS
POST-OPERATIVE INSTRUCTIONS FOLLOWING RETINA SURGERY    Yue Jackson MD, PhD  Department of Ophthalmology  [facility]  [phone]      ACTIVITY:  No heavy lifting for 2 weeks after surgery.  No swimming for 3 weeks after surgery.  It is OK for you to shower, to wash your face, and to wash your hair.  Allow the shower to hit the top of your head and wash down your face.  Do not hit your eye directly with the jet from the shower.  Keep your eye covered with the shield when you sleep for 1 week after surgery.  If your shield has a tab, it is designed to go over the bridge of your nose.  Place one piece of tape diagonally from the center of your forehead to the side of your cheek to secure the shield.   During the daytime, you can use either the shield or your regular eyeglasses or sunglasses to protect your eye.    GAS BUBBLE POSITIONING REQUIREMENTS  Positioning requirements will be discussed with you if you have an oil or gas bubble in your eye:    Position:    Upright daytime, sleep on left hand side    When positioning, it is OK to take brief breaks to eat, stretch, clean up, etc.  Try to position for 90% of the time (5 minute break per hour on average).  Do not lay flat on your back until the bubble is gone.  Do not fly on an airplane or travel to elevations more than 1000 feet above [place] until the bubble is gone.  Keep the green bracelet on your wrist until the bubble is gone.  If it breaks, we can give you a replacement      EYE DROPS  Use these drops after surgery.  When using more than one drop, separate them by 3 minutes between drops.  Common times to place drops are breakfast, lunch, dinner, and bedtime.  Do not stop your drops without discussing with our office.  If you run out before your appointment, call and we will send in a refill.      Stop Ofloxacin   Pred Forte (prednisolone acetate) --- Decrease to 1 drop 3 times a day for 1 week then decrease to 1 drop 2 times a day  for 1 week then decrease to 1 drop 1 time a day for 1 week and then stop      WHAT TO EXPECT  It is common for the eye to to have a blood tinged discharge for a few days after surgery  It may feel irritated (as if something were in your eye), for there to be clear discharge (thicker in the mornings upon awakening), and for it to be bloodshot for 2-3 weeks following retina surgery.   Your vision is also commonly decreased during this time due to the bubble.          WHAT TO WATCH OUT FOR  If you experience any of the following, you should call immediately:  Increasing pain  Increasing nausea or vomiting  Increasing redness  Worsening or darkening of the vision  New flashing lights or floaters      For any of the symptoms listed above, or for other concerns, call [phone] and ask to speak to the clinic nurse.  If you call after hours, follow to prompts to reach the doctor on call.

## 2024-02-02 NOTE — PROGRESS NOTES
CC -   Post Op OD    INTERVAL HISTORY - POW #1 s/p PPV/SBP/SF6 OD 1/25/24. Patient states that vision improving. No pain, no changes in vision.    PMH -   Terell Sebastian is a  73 year old year-old patient with history of mac-off RD OD, symptoms week prior, mac off since at least 1/20/24 by history, had floaters and flashes starting 1/17/24  No trauma    No DM, no HTn  natural monovision, OD was distance eye    PAST OCULAR SURGERY  PPV/SBP/SF6 OD 1/25/24    RETINAL IMAGING:  OCT 02/02/24  OD -  focal SRF pockets        ASSESSMENT & PLAN    # POW #1 OD   - mac off RRD repair PPV/SBP/SF6 1/25/24   - IOP OK, retina flat, no infection   - tr SRF on OCT likely residual fluid resorbing     - SF6 gas bubble 40 %   - Prednisolone 1 drop 3x/day for 1 week then decrease to 1 drop 2x/day for 1 week then decrease to 1 drop 1x a day for 1 week and then stop   - Stop ofloxacin   - Upright daytime and sleep LHS down        # PVD OS   - Monitor    # Lattice OU       # NS OU    # ONH drusen OS   - noted on FAF 1/22/24    # 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       Dale Young MD  PGY-5 Vitreo-retina surgery Fellow  Department of Ophthalmology   [facility]       ATTESTATION     Attending Attestation:     Complete documentation of historical and exam elements from today's encounter can be found in the full encounter summary report (not reduplicated in this progress note).  I personally obtained the chief complaint(s) and history of present illness.  I confirmed and edited as necessary the review of systems, past medical/surgical history, family history, social history, and examination findings as documented by others; and I examined the patient myself.  I personally reviewed the relevant tests,  images, and reports as documented above.  I personally reviewed the ophthalmic test(s) associated with this encounter, agree with the interpretation(s) as documented by the resident/fellow, and have edited the corresponding report(s) as necessary.   I formulated and edited as necessary the assessment and plan and discussed the findings and management plan with the patient and family    Yue Jackson MD, PhD  , Vitreoretinal Surgery  Department of Ophthalmology  [facility]

## 2024-02-02 NOTE — NURSING NOTE
Chief Complaints and History of Present Illnesses   Patient presents with    Post Op (Ophthalmology) Right Eye     s/p PPV/SBP/SF6 OD 1/25/24     Chief Complaint(s) and History of Present Illness(es)       Post Op (Ophthalmology) Right Eye              Comments: s/p PPV/SBP/SF6 OD 1/25/24              Comments    No new problems or concerns   No eye pain     Yudith Guo COT 10:40 AM February 2, 2024

## 2024-02-06 DIAGNOSIS — H33.21 RIGHT RETINAL DETACHMENT: Primary | ICD-10-CM

## 2024-02-23 ENCOUNTER — OFFICE VISIT (OUTPATIENT)
Dept: OPHTHALMOLOGY | Facility: CLINIC | Age: 74
End: 2024-02-23
Attending: OPHTHALMOLOGY
Payer: COMMERCIAL

## 2024-02-23 DIAGNOSIS — H33.21 RIGHT RETINAL DETACHMENT: ICD-10-CM

## 2024-02-23 PROCEDURE — 99207 OCT RETINA SPECTRALIS OD (RIGHT EYE): CPT | Mod: 26 | Performed by: OPHTHALMOLOGY

## 2024-02-23 PROCEDURE — 99213 OFFICE O/P EST LOW 20 MIN: CPT | Performed by: OPHTHALMOLOGY

## 2024-02-23 PROCEDURE — 99024 POSTOP FOLLOW-UP VISIT: CPT | Mod: GC | Performed by: OPHTHALMOLOGY

## 2024-02-23 PROCEDURE — 92134 CPTRZ OPH DX IMG PST SGM RTA: CPT | Performed by: OPHTHALMOLOGY

## 2024-02-23 ASSESSMENT — VISUAL ACUITY
OS_PH_SC: 20/25
OD_PH_SC: 20/60
OS_SC: 20/40
OD_SC: 20/100
OS_PH_SC+: -1
METHOD: SNELLEN - LINEAR

## 2024-02-23 ASSESSMENT — SLIT LAMP EXAM - LIDS
COMMENTS: NORMAL
COMMENTS: NORMAL

## 2024-02-23 ASSESSMENT — TONOMETRY
OS_IOP_MMHG: 26
IOP_METHOD: APPLANATION
OD_IOP_MMHG: 20

## 2024-02-23 ASSESSMENT — CUP TO DISC RATIO: OD_RATIO: SMALL

## 2024-02-23 ASSESSMENT — EXTERNAL EXAM - RIGHT EYE: OD_EXAM: NORMAL

## 2024-02-23 NOTE — NURSING NOTE
Chief Complaints and History of Present Illnesses   Patient presents with    Post Op (Ophthalmology) Right Eye      s/p PPV/SBP/SF6 OD 1/25/24.     Chief Complaint(s) and History of Present Illness(es)       Post Op (Ophthalmology) Right Eye              Comments:  s/p PPV/SBP/SF6 OD 1/25/24.              Comments    No new problems or concerns   No eye pain     Yudith Guo COT 10:03 AM February 23, 2024

## 2024-02-23 NOTE — PROGRESS NOTES
CC -   Post Op OD    INTERVAL HISTORY - POM #1 s/p PPV/SBP/SF6 OD 1/25/24. Patient states that vision improving. No pain, no changes in vision.  no diplopia    PMH -   Terell Sebastian is a  73 year old year-old patient with history of mac-off RD OD, symptoms week prior, mac off since at least 1/20/24 by history, had floaters and flashes starting 1/17/24  No trauma    No DM, no HTn  natural monovision, OD was distance eye    PAST OCULAR SURGERY  PPV/SBP/SF6 OD 1/25/24    RETINAL IMAGING:  OCT 02/23/24   OD -  focal SRF pockets improving        ASSESSMENT & PLAN    # POM #1 OD   - mac off RRD repair PPV/SBP/SF6 1/25/24   - IOP OK, retina flat, no infection   - tr SRF on OCT likely residual fluid resorbing better than 2/2/24     - bubble gone, off gtts   - no diplopia    - f/u 1 month      # PVD OS   - Monitor    # Lattice OU   - Monitor   - Discussed RD precautions 02/23/24     # NS OU    # ONH drusen OS   - noted on FAF 1/22/24    # 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       Eladio Guajardo MD  Resident Physician, PGY-2  Department of Ophthalmology        ATTESTATION     Attending Attestation:     Complete documentation of historical and exam elements from today's encounter can be found in the full encounter summary report (not reduplicated in this progress note).  I personally obtained the chief complaint(s) and history of present illness.  I confirmed and edited as necessary the review of systems, past medical/surgical history, family history, social history, and examination findings as documented by others; and I examined the patient myself.  I personally reviewed the relevant tests, images, and reports as documented above.  I personally reviewed the ophthalmic test(s) associated with  this encounter, agree with the interpretation(s) as documented by the resident/fellow, and have edited the corresponding report(s) as necessary.   I formulated and edited as necessary the assessment and plan and discussed the findings and management plan with the patient and family    Yue Jackson MD, PhD  , Vitreoretinal Surgery  Department of Ophthalmology  [facility]

## 2024-03-11 DIAGNOSIS — H33.21 RIGHT RETINAL DETACHMENT: Primary | ICD-10-CM

## 2024-03-22 ENCOUNTER — OFFICE VISIT (OUTPATIENT)
Dept: OPHTHALMOLOGY | Facility: CLINIC | Age: 74
End: 2024-03-22
Attending: OPHTHALMOLOGY
Payer: COMMERCIAL

## 2024-03-22 DIAGNOSIS — H40.002 GLAUCOMA SUSPECT, LEFT: Primary | ICD-10-CM

## 2024-03-22 DIAGNOSIS — H33.21 RIGHT RETINAL DETACHMENT: ICD-10-CM

## 2024-03-22 PROCEDURE — 99211 OFF/OP EST MAY X REQ PHY/QHP: CPT | Performed by: OPHTHALMOLOGY

## 2024-03-22 PROCEDURE — 92133 CPTRZD OPH DX IMG PST SGM ON: CPT | Mod: 25 | Performed by: OPHTHALMOLOGY

## 2024-03-22 PROCEDURE — 92134 CPTRZ OPH DX IMG PST SGM RTA: CPT | Performed by: OPHTHALMOLOGY

## 2024-03-22 PROCEDURE — 99207 OCT OPTIC NERVE RNFL SPECTRALIS OU (BOTH EYES): CPT | Mod: 26 | Performed by: OPHTHALMOLOGY

## 2024-03-22 PROCEDURE — 99207 OCT RETINA SPECTRALIS OU (BOTH EYE): CPT | Mod: 26 | Performed by: OPHTHALMOLOGY

## 2024-03-22 PROCEDURE — 99024 POSTOP FOLLOW-UP VISIT: CPT | Performed by: OPHTHALMOLOGY

## 2024-03-22 RX ORDER — LATANOPROST 50 UG/ML
1 SOLUTION/ DROPS OPHTHALMIC AT BEDTIME
Qty: 2.5 ML | Refills: 11 | Status: SHIPPED | OUTPATIENT
Start: 2024-03-22

## 2024-03-22 ASSESSMENT — VISUAL ACUITY
METHOD: SNELLEN - LINEAR
OD_SC: 20/150
OD_PH_SC: 20/50
OS_SC: 20/25
OS_SC+: -2

## 2024-03-22 ASSESSMENT — SLIT LAMP EXAM - LIDS
COMMENTS: NORMAL
COMMENTS: NORMAL

## 2024-03-22 ASSESSMENT — TONOMETRY
OS_IOP_MMHG: 18
IOP_METHOD: TONOPEN
OD_IOP_MMHG: 14

## 2024-03-22 ASSESSMENT — EXTERNAL EXAM - LEFT EYE: OS_EXAM: NORMAL

## 2024-03-22 ASSESSMENT — EXTERNAL EXAM - RIGHT EYE: OD_EXAM: NORMAL

## 2024-03-22 ASSESSMENT — CUP TO DISC RATIO
OD_RATIO: SMALL
OS_RATIO: 0.1

## 2024-03-22 NOTE — NURSING NOTE
Chief Complaints and History of Present Illnesses   Patient presents with    Follow Up     1 month follow up RE  Pt reports not much change doing PERLITA JONES 8:44 AM March 22, 2024            Chief Complaint(s) and History of Present Illness(es)       Follow Up              Laterality: right eye    Associated symptoms: Negative for eye pain, pain with eye movement, flashes and floaters    Comments: 1 month follow up RE  Pt reports not much change doing OK  Angelique JONES 8:44 AM March 22, 2024

## 2024-03-22 NOTE — PROGRESS NOTES
CC -   Post Op OD    INTERVAL HISTORY - POM #2  s/p PPV/SBP/SF6 OD 1/25/24. Patient states that vision improving. No pain, no changes in vision.  no diplopia    PMH -   Terell Sebastian is a  73 year old year-old patient with history of mac-off RD OD, symptoms week prior, mac off since at least 1/20/24 by history, had floaters and flashes starting 1/17/24  No trauma    No DM, no HTn  natural monovision, OD was distance eye    PAST OCULAR SURGERY  PPV/SBP/SF6 OD 1/25/24    RETINAL IMAGING:  OCT 02/23/24   OD -  focal SRF pockets improving  OS - retina normal, PVD    OCT RNFL 03/22/24  OD - abnl ST sector  OS - abnormal ST/IT/G, borderline T        ASSESSMENT & PLAN    # POM #2 OD   - mac off RRD repair PPV/SBP/SF6 1/25/24   - IOP OK, retina flat, no infection   - tr SRF on OCT likely residual fluid resorbing better than 2/2/24     - bubble gone, off gtts   - no diplopia    - f/u 1 month      # PVD OS   - Monitor    # Lattice OU   - Monitor   - Discussed RD precautions 02/23/24     # NS OU    # ONH drusen OS   - noted on FAF 1/22/24    # 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            ATTESTATION     Attending Attestation:     Complete documentation of historical and exam elements from today's encounter can be found in the full encounter summary report (not reduplicated in this progress note).  I personally obtained the chief complaint(s) and history of present illness.  I confirmed and edited as necessary the review of systems, past medical/surgical history, family history, social history, and examination findings as documented by others; and I  examined the patient myself.  I personally reviewed the relevant tests, images, and reports as documented above.  I formulated and edited as necessary the assessment and plan and discussed the findings and management plan with the patient and family    Yue Jackson MD, PhD  , Vitreoretinal Surgery  Department of Ophthalmology  [facility]

## 2024-05-06 DIAGNOSIS — H40.002 GLAUCOMA SUSPECT, LEFT: Primary | ICD-10-CM

## 2024-05-09 ENCOUNTER — OFFICE VISIT (OUTPATIENT)
Dept: FAMILY MEDICINE | Facility: CLINIC | Age: 74
End: 2024-05-09
Payer: COMMERCIAL

## 2024-05-09 VITALS
HEIGHT: 68 IN | TEMPERATURE: 96.9 F | DIASTOLIC BLOOD PRESSURE: 78 MMHG | BODY MASS INDEX: 18.49 KG/M2 | HEART RATE: 52 BPM | WEIGHT: 122 LBS | RESPIRATION RATE: 11 BRPM | OXYGEN SATURATION: 98 % | SYSTOLIC BLOOD PRESSURE: 130 MMHG

## 2024-05-09 DIAGNOSIS — N52.9 ERECTILE DYSFUNCTION, UNSPECIFIED ERECTILE DYSFUNCTION TYPE: ICD-10-CM

## 2024-05-09 DIAGNOSIS — D12.6 ADENOMATOUS POLYP OF COLON, UNSPECIFIED PART OF COLON: ICD-10-CM

## 2024-05-09 DIAGNOSIS — Z00.00 HEALTHCARE MAINTENANCE: Primary | ICD-10-CM

## 2024-05-09 DIAGNOSIS — J45.20 MILD INTERMITTENT ASTHMA, UNSPECIFIED WHETHER COMPLICATED: ICD-10-CM

## 2024-05-09 DIAGNOSIS — K40.20 NON-RECURRENT BILATERAL INGUINAL HERNIA WITHOUT OBSTRUCTION OR GANGRENE: ICD-10-CM

## 2024-05-09 DIAGNOSIS — H33.21 RIGHT RETINAL DETACHMENT: ICD-10-CM

## 2024-05-09 LAB
CHOLEST SERPL-MCNC: 163 MG/DL
FASTING STATUS PATIENT QL REPORTED: YES
GLUCOSE BLD-MCNC: 82 MG/DL (ref 60–99)
HDLC SERPL-MCNC: 78 MG/DL
LDLC SERPL CALC-MCNC: 77 MG/DL
NONHDLC SERPL-MCNC: 85 MG/DL
TRIGL SERPL-MCNC: 42 MG/DL

## 2024-05-09 PROCEDURE — 82947 ASSAY GLUCOSE BLOOD QUANT: CPT | Performed by: FAMILY MEDICINE

## 2024-05-09 PROCEDURE — 80061 LIPID PANEL: CPT | Performed by: FAMILY MEDICINE

## 2024-05-09 PROCEDURE — 36415 COLL VENOUS BLD VENIPUNCTURE: CPT | Performed by: FAMILY MEDICINE

## 2024-05-09 PROCEDURE — 99387 INIT PM E/M NEW PAT 65+ YRS: CPT | Mod: 25 | Performed by: FAMILY MEDICINE

## 2024-05-09 PROCEDURE — 90677 PCV20 VACCINE IM: CPT | Performed by: FAMILY MEDICINE

## 2024-05-09 PROCEDURE — 99213 OFFICE O/P EST LOW 20 MIN: CPT | Mod: 25 | Performed by: FAMILY MEDICINE

## 2024-05-09 PROCEDURE — 90471 IMMUNIZATION ADMIN: CPT | Performed by: FAMILY MEDICINE

## 2024-05-09 RX ORDER — SILDENAFIL CITRATE 20 MG/1
20 TABLET ORAL 3 TIMES DAILY
Qty: 6 TABLET | Refills: 3 | Status: SHIPPED | OUTPATIENT
Start: 2024-05-09

## 2024-05-09 RX ORDER — SILDENAFIL 25 MG/1
25 TABLET, FILM COATED ORAL DAILY PRN
Qty: 6 TABLET | Refills: 9 | Status: SHIPPED | OUTPATIENT
Start: 2024-05-09

## 2024-05-09 RX ORDER — RESPIRATORY SYNCYTIAL VIRUS VACCINE 120MCG/0.5
0.5 KIT INTRAMUSCULAR ONCE
Qty: 1 EACH | Refills: 0 | Status: CANCELLED | OUTPATIENT
Start: 2024-05-09 | End: 2024-05-09

## 2024-05-09 ASSESSMENT — ASTHMA QUESTIONNAIRES
QUESTION_4 LAST FOUR WEEKS HOW OFTEN HAVE YOU USED YOUR RESCUE INHALER OR NEBULIZER MEDICATION (SUCH AS ALBUTEROL): ONCE A WEEK OR LESS
QUESTION_2 LAST FOUR WEEKS HOW OFTEN HAVE YOU HAD SHORTNESS OF BREATH: NOT AT ALL
ACT_TOTALSCORE: 23
QUESTION_5 LAST FOUR WEEKS HOW WOULD YOU RATE YOUR ASTHMA CONTROL: WELL CONTROLLED
ACT_TOTALSCORE: 23
QUESTION_1 LAST FOUR WEEKS HOW MUCH OF THE TIME DID YOUR ASTHMA KEEP YOU FROM GETTING AS MUCH DONE AT WORK, SCHOOL OR AT HOME: NONE OF THE TIME
QUESTION_3 LAST FOUR WEEKS HOW OFTEN DID YOUR ASTHMA SYMPTOMS (WHEEZING, COUGHING, SHORTNESS OF BREATH, CHEST TIGHTNESS OR PAIN) WAKE YOU UP AT NIGHT OR EARLIER THAN USUAL IN THE MORNING: NOT AT ALL

## 2024-05-09 NOTE — ASSESSMENT & PLAN NOTE
Previous repair, sees some lumps in inguinal canal, I think these are the repair line/[place].  I do not see any sign of recurrent hernia.  He assured.

## 2024-05-09 NOTE — PROGRESS NOTES
Adult Male Physical  A/P  Adenomatous polyp of colon  Discussed 2027 colonoscopy plan    Erectile dysfunction  Uses very low-dose sildenafil, half of a 25 mg pill    Healthcare maintenance  Low risk, no RSV  Shingrix  Up-to-date on COVID    Aged out prostate cancer screening    Mild intermittent asthma  Sees allergy, takes medications for asthma seasonally.  ACT 23, no change    Non-recurrent bilateral inguinal hernia without obstruction or gangrene  Previous repair, sees some lumps in inguinal canal, I think these are the repair line/[place]-Wolf[place].  I do not see any sign of recurrent hernia.  He assured.     Discussed healthcare directives.    HPI  Current Concerns/ Questions  74-year-old, here for reestablishing care/physical.  Question about appearance of inguinal canal after hernia surgery.  No other concerns.  PFSH:  Current medications reviewed as follows:  Current Outpatient Medications   Medication Sig Dispense Refill    albuterol (PROVENTIL HFA;VENTOLIN HFA) 90 mcg/actuation inhaler [ALBUTEROL (PROVENTIL HFA;VENTOLIN HFA) 90 MCG/ACTUATION INHALER] Inhale 2 puffs every 6 (six) hours as needed for wheezing.      fish oil-omega-3 fatty acids 500 MG capsule       fluticasone (FLOVENT HFA) 44 MCG/ACT inhaler Inhale 2 puffs into the lungs 2 times daily 10.6 g 3    latanoprost (XALATAN) 0.005 % ophthalmic solution Place 1 drop Into the left eye at bedtime 2.5 mL 11    multivitamin with minerals (THERA-M) 9 mg iron-400 mcg Tab tablet [MULTIVITAMIN WITH MINERALS (THERA-M) 9 MG IRON-400 MCG TAB TABLET] Take 1 tablet by mouth daily.      sildenafil (REVATIO) 20 MG tablet Take 1 tablet (20 mg) by mouth 3 times daily 6 tablet 3    sildenafil (VIAGRA) 25 MG tablet Take 1 tablet (25 mg) by mouth daily as needed 6 tablet 9    vitamin E 1000 UNIT capsule [VITAMIN E 1000 UNIT CAPSULE] Take 1,000 Units by mouth daily.       No current facility-administered medications for this visit.      Patient Active Problem List   Diagnosis     Backache    Allergic Rhinitis    BPH (benign prostatic hyperplasia)    Benign Prostatic Hypertrophy With Urinary Obstruction    Prostatitis    Mild intermittent asthma    Adenomatous polyp of colon    Healthcare maintenance    Non-recurrent bilateral inguinal hernia without obstruction or gangrene    Erectile dysfunction    Actinic keratoses    Bilateral inguinal hernia    Right retinal detachment     Past Medical History:   Diagnosis Date    Asthma      Past Surgical History:   Procedure Laterality Date    NO PAST SURGERIES      MI LAP,INGUINAL HERNIA REPR,INITIAL Bilateral 01/24/2020    Procedure: Laparoscopic bilateral inguinal hernia repair;  Surgeon: Ricardo Valerio MD;  Location: [facility];  Service: General     Family History   Problem Relation Age of Onset    Alzheimer Disease Other         Paternal    Breast Cancer Other         Paternal     Heart Disease Father     Asthma Sister     Breast Cancer Sister     Asthma Maternal Aunt     Pneumonia Mother      History   Smoking Status    Never   Smokeless Tobacco    Never       Social History     Social History Narrative    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     Immunization History   Administered Date(s) Administered    COVID-19 12+ (2023-24) (MODERNA) 02/26/2024    COVID-19 Bivalent 12+ (Pfizer) 09/30/2022    COVID-19 MONOVALENT 12+ (Pfizer) 03/05/2021, 03/26/2021, 10/30/2021    DT (PEDS <7y) 12/09/2004    Flu, Unspecified 10/26/2007, 10/09/2009, 10/25/2010, 11/18/2011    Influenza (H1N1) 01/08/2010    Influenza (High Dose) 3 valent vaccine 09/05/2017, 11/06/2019    Influenza Vaccine 65+ (FLUAD) 10/14/2023    Influenza Vaccine, 6+MO IM (QUADRIVALENT W/PRESERVATIVES) 10/04/2012, 01/25/2017    Pneumo Conj 13-V (2010&after) 04/26/2018    Pneumococcal 20 valent Conjugate (Prevnar 20) 05/09/2024    Pneumococcal 23 valent 11/18/1997    TDAP (Adacel,Boostrix) 01/25/2017    Td (Adult),  "Adsorbed 12/09/2004    Td,adult,historic,unspecified 12/09/2004     Body mass index is 18.55 kg/m .        Objective  Physical Exam  Vitals:    05/09/24 0833 05/09/24 0841   BP: (!) 144/84 130/78   Pulse: 53 52   Resp: 11    Temp: 96.9  F (36.1  C)    SpO2: 98%    Weight: 55.3 kg (122 lb)    Height: 1.727 m (5' 8\")        Gen- alert and oriented x3, no acute distress  HEENT- Normocephalic atraumatic   pupils equal and reactive, EOMI.    TMs visualized and normal, ear canals normal.    Mouth moist with normal mucosa no ulceration, dentition normal or in good repair.  Neck- supple, no adenopathy or thyromegaly  Chest- Normal chest wall apperance, normal inspiration and expiration.  Clear to asculation.  CV- Regular rate and rhythm, normal tones, no murmus, gallops or rubs.  Abd-  Soft, nodistended, nontender.  Normal bowel sounds, no mass or organ enlargement.  Genitalia-  was done  and result was normal  KAYA: was not done  Ext- Atraumatic,  No synovial thickening. Good perfusion, no edema. Periph pulses detected  Skin- warm and dry, no rash  Neuro- Cranial nerves grossly intact.  Normal gait, normal strength.  Reflexes symmetric.  Coordination intact.    Diagnostics  Results for orders placed or performed in visit on 05/09/24   Glucose, whole blood     Status: Normal   Result Value Ref Range    Glucose Whole Blood 82 60 - 99 mg/dL                     "

## 2024-05-10 ENCOUNTER — PATIENT OUTREACH (OUTPATIENT)
Dept: GASTROENTEROLOGY | Facility: CLINIC | Age: 74
End: 2024-05-10
Payer: COMMERCIAL

## 2024-05-21 ENCOUNTER — OFFICE VISIT (OUTPATIENT)
Dept: OPHTHALMOLOGY | Facility: CLINIC | Age: 74
End: 2024-05-21
Attending: OPHTHALMOLOGY
Payer: COMMERCIAL

## 2024-05-21 DIAGNOSIS — H33.21 RIGHT RETINAL DETACHMENT: Primary | ICD-10-CM

## 2024-05-21 DIAGNOSIS — H40.002 GLAUCOMA SUSPECT, LEFT: ICD-10-CM

## 2024-05-21 PROCEDURE — 67105 REPAIR DETACHED RETINA PC: CPT | Mod: RT | Performed by: OPHTHALMOLOGY

## 2024-05-21 PROCEDURE — 92134 CPTRZ OPH DX IMG PST SGM RTA: CPT | Performed by: OPHTHALMOLOGY

## 2024-05-21 PROCEDURE — 99213 OFFICE O/P EST LOW 20 MIN: CPT | Mod: 25 | Performed by: OPHTHALMOLOGY

## 2024-05-21 ASSESSMENT — EXTERNAL EXAM - RIGHT EYE: OD_EXAM: NORMAL

## 2024-05-21 ASSESSMENT — CONF VISUAL FIELD
OD_SUPERIOR_TEMPORAL_RESTRICTION: 2
OD_INFERIOR_TEMPORAL_RESTRICTION: 1
OS_NORMAL: 1
OS_INFERIOR_TEMPORAL_RESTRICTION: 0
OD_SUPERIOR_NASAL_RESTRICTION: 2
OS_INFERIOR_NASAL_RESTRICTION: 0
OS_SUPERIOR_NASAL_RESTRICTION: 0
METHOD: COUNTING FINGERS
OD_INFERIOR_NASAL_RESTRICTION: 1
OS_SUPERIOR_TEMPORAL_RESTRICTION: 0

## 2024-05-21 ASSESSMENT — REFRACTION_MANIFEST
OS_SPHERE: -0.50
OS_CYLINDER: SPHERE
OD_AXIS: 176
OS_ADD: +2.50
OD_CYLINDER: +0.25
OD_ADD: +2.50
OD_SPHERE: -2.75

## 2024-05-21 ASSESSMENT — VISUAL ACUITY
OS_SC: 20/25
METHOD_MR: DIAGNOSTIC REFRACTION
METHOD: SNELLEN - LINEAR
OS_SC+: -1
OD_PH_SC: 20/60
OD_SC: 20/200

## 2024-05-21 ASSESSMENT — CUP TO DISC RATIO: OD_RATIO: SMALL

## 2024-05-21 ASSESSMENT — TONOMETRY
IOP_METHOD: TONOPEN
OD_IOP_MMHG: 12
OS_IOP_MMHG: 15

## 2024-05-21 ASSESSMENT — SLIT LAMP EXAM - LIDS
COMMENTS: NORMAL
COMMENTS: NORMAL

## 2024-05-21 ASSESSMENT — EXTERNAL EXAM - LEFT EYE: OS_EXAM: NORMAL

## 2024-05-21 NOTE — PROGRESS NOTES
CC -   retina follow up    INTERVAL HISTORY - POM #4  s/p PPV/SBP/SF6 OD 1/25/24. Patient has distortion in the right eye since surgery. No other concerns today.    [facility] -   Terell Sebastian is a  74 year old year-old patient with history of mac-off RD OD, symptoms week prior, mac off since at least 1/20/24 by history, had floaters and flashes starting 1/17/24  No trauma    No DM, no HTn  natural monovision, OD was distance eye    PAST OCULAR SURGERY  PPV/SBP/SF6 OD 1/25/24    RETINAL IMAGING:  OCT 5/21/24  OD -  focal SRF pockets stable/better  today  OS - retina normal, PVD    OCT RNFL 03/22/24  OD - abnl ST sector  OS - abnormal ST/IT/G, borderline T        ASSESSMENT & PLAN    # h/o RD repair OD   - mac off RRD repair PPV/SBP/SF6 1/25/24   - IOP OK     - RD far periphery inferior few clock hours just anterior to buckle   - advise pexy to barricade RD   - recheck 2 weeks     - r/b/a d/w patient: failure to prevent RD progression, need for further surgeries      # PVD OS   - Monitor    # Lattice OU   - Monitor   - Discussed RD precautions 02/23/24     # NS OU   - updated refraction today. 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     Angelia Zavaleta MD  PGY3 Ophthalmology Resident  [facility]       ATTESTATION     Attending Attestation:     Complete documentation of historical and exam elements from today's encounter can be found in the full encounter summary report (not reduplicated in this progress note).  I personally obtained the chief complaint(s) and history of present  illness.  I confirmed and edited as necessary the review of systems, past medical/surgical history, family history, social history, and examination findings as documented by others; and I examined the patient myself.  I personally reviewed the relevant tests, images, and reports as documented above.  I personally reviewed the ophthalmic test(s) associated with this encounter, agree with the interpretation(s) as documented by the resident/fellow, and have edited the corresponding report(s) as necessary.   I formulated and edited as necessary the assessment and plan and discussed the findings and management plan with the patient and family and No resident or fellow assisted with the procedures performed.  I performed the procedures myself.    Yue Jackson MD, PhD  , Vitreoretinal Surgery  Department of Ophthalmology  [facility]

## 2024-05-21 NOTE — NURSING NOTE
Chief Complaints and History of Present Illnesses   Patient presents with    Retinal Detachment Follow Up     Chief Complaint(s) and History of Present Illness(es)       Retinal Detachment Follow Up              Laterality: right eye    Associated symptoms: Negative for eye pain    Pain scale: 0/10              Comments    Terell is here to continue care for retinal detachment of right and for being a glaucoma suspect left eye. He says right eye is blurry and vision distorted.    Jagdish Godinez COT 3:05 PM May 21, 2024

## 2024-06-11 ENCOUNTER — OFFICE VISIT (OUTPATIENT)
Dept: OPHTHALMOLOGY | Facility: CLINIC | Age: 74
End: 2024-06-11
Attending: OPHTHALMOLOGY
Payer: COMMERCIAL

## 2024-06-11 DIAGNOSIS — Z98.890 POST-OPERATIVE STATE: Primary | ICD-10-CM

## 2024-06-11 PROCEDURE — 99211 OFF/OP EST MAY X REQ PHY/QHP: CPT | Performed by: OPHTHALMOLOGY

## 2024-06-11 PROCEDURE — 99024 POSTOP FOLLOW-UP VISIT: CPT | Performed by: OPHTHALMOLOGY

## 2024-06-11 ASSESSMENT — REFRACTION_WEARINGRX
OS_CYLINDER: SPHERE
OD_SPHERE: -2.75
OS_ADD: +2.50
OD_ADD: +2.50
OD_AXIS: 176
OS_SPHERE: -0.50
OD_CYLINDER: +0.25

## 2024-06-11 ASSESSMENT — TONOMETRY
IOP_METHOD: TONOPEN
OD_IOP_MMHG: 10
OS_IOP_MMHG: 14

## 2024-06-11 ASSESSMENT — EXTERNAL EXAM - LEFT EYE: OS_EXAM: NORMAL

## 2024-06-11 ASSESSMENT — SLIT LAMP EXAM - LIDS
COMMENTS: NORMAL
COMMENTS: NORMAL

## 2024-06-11 ASSESSMENT — CUP TO DISC RATIO: OD_RATIO: SMALL

## 2024-06-11 ASSESSMENT — VISUAL ACUITY
OD_CC: 20/50-/+2
CORRECTION_TYPE: GLASSES
OS_CC: 20/20-3

## 2024-06-11 ASSESSMENT — EXTERNAL EXAM - RIGHT EYE: OD_EXAM: NORMAL

## 2024-06-11 NOTE — PROGRESS NOTES
CC -   retina follow up    INTERVAL HISTORY - POM #4  s/p PPV/SBP/SF6 OD 1/25/24. Patient has distortion in the right eye since surgery. No other concerns today.    [facility] -   Terell Sebastian is a  74 year old year-old patient with history of mac-off RD OD, symptoms week prior, mac off since at least 1/20/24 by history, had floaters and flashes starting 1/17/24  No trauma    No DM, no HTn  natural monovision, OD was distance eye    PAST OCULAR SURGERY  PPV/SBP/SF6 OD 1/25/24  Pexy OD 5/21/24      RETINAL IMAGING:  OCT 5/21/24  OD -  focal SRF pockets, central outer atrophy/irregular stable/better   OS - retina normal, PVD    OCT RNFL 03/22/24  OD - abnl ST sector  OS - abnormal ST/IT/G, borderline T        ASSESSMENT & PLAN    # h/o RD repair OD   - mac off RRD repair PPV/SBP/SF6 1/25/24   - IOP OK     - RD far periphery inferior few clock hours just anterior to buckle Tx with pexy 5/21/24   - well contained   - recheck 2-3 months      # PVD OS   - Monitor    # Lattice OU   - Monitor   - Discussed RD precautions 02/23/24     # NS OD   - updated refraction today. 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          ATTESTATION     Attending Attestation:     Complete documentation of historical and exam elements from today's encounter can be found in the full encounter summary report (not reduplicated in this progress note).  I personally obtained the chief complaint(s) and history of present illness.  I confirmed and edited as necessary the review of systems, past  medical/surgical history, family history, social history, and examination findings as documented by others; and I examined the patient myself.  I personally reviewed the relevant tests, images, and reports as documented above.  I formulated and edited as necessary the assessment and plan and discussed the findings and management plan with the patient and family    Yue Jackson MD, PhD  , Vitreoretinal Surgery  Department of Ophthalmology  [facility]

## 2024-07-29 DIAGNOSIS — H33.21 RIGHT RETINAL DETACHMENT: Primary | ICD-10-CM

## 2024-08-13 ENCOUNTER — OFFICE VISIT (OUTPATIENT)
Dept: OPHTHALMOLOGY | Facility: CLINIC | Age: 74
End: 2024-08-13
Attending: OPHTHALMOLOGY
Payer: COMMERCIAL

## 2024-08-13 DIAGNOSIS — H33.21 RIGHT RETINAL DETACHMENT: ICD-10-CM

## 2024-08-13 PROCEDURE — 92250 FUNDUS PHOTOGRAPHY W/I&R: CPT | Performed by: OPHTHALMOLOGY

## 2024-08-13 PROCEDURE — 99213 OFFICE O/P EST LOW 20 MIN: CPT | Mod: GC | Performed by: OPHTHALMOLOGY

## 2024-08-13 PROCEDURE — 99207 FUNDUS PHOTOS OU (BOTH EYES): CPT | Mod: 26 | Performed by: OPHTHALMOLOGY

## 2024-08-13 PROCEDURE — 99211 OFF/OP EST MAY X REQ PHY/QHP: CPT | Performed by: OPHTHALMOLOGY

## 2024-08-13 PROCEDURE — 92134 CPTRZ OPH DX IMG PST SGM RTA: CPT | Performed by: OPHTHALMOLOGY

## 2024-08-13 ASSESSMENT — VISUAL ACUITY
OS_CC: 20/20-2
OD_SC: 20/150
METHOD: SNELLEN - LINEAR
OS_SC: 20/25-2/+2
OD_CC: 20/50+2

## 2024-08-13 ASSESSMENT — REFRACTION_WEARINGRX
OS_CYLINDER: SPHERE
OD_ADD: +2.50
OS_ADD: +2.50
OD_AXIS: 176
OS_SPHERE: -0.50
OD_SPHERE: -2.75
OD_CYLINDER: +0.25

## 2024-08-13 ASSESSMENT — EXTERNAL EXAM - RIGHT EYE: OD_EXAM: NORMAL

## 2024-08-13 ASSESSMENT — EXTERNAL EXAM - LEFT EYE: OS_EXAM: NORMAL

## 2024-08-13 ASSESSMENT — CUP TO DISC RATIO
OD_RATIO: SMALL
OS_RATIO: 0.1

## 2024-08-13 ASSESSMENT — SLIT LAMP EXAM - LIDS
COMMENTS: NORMAL
COMMENTS: NORMAL

## 2024-08-13 ASSESSMENT — TONOMETRY
IOP_METHOD: TONOPEN
OD_IOP_MMHG: 09
OS_IOP_MMHG: 12

## 2024-08-13 NOTE — PROGRESS NOTES
CC -   h/o RD    INTERVAL HISTORY - Last visit 6/11/24. States no changes in vision. One good eye, one bad eye. Vision in the right eye is blurry and distorted. Can see objects at 6 inches to 1 foot.   Got spectacles but feels they don't help much so doesn't use regularly, did not bring to clnic today      PMH -   Terell Sebastian is a  74 year old year-old patient with history of mac-off RD OD, symptoms week prior, mac off since at least 1/20/24 by history, had floaters and flashes starting 1/17/24    No trauma    No DM, no HTN  natural monovision, OD was distance eye    PAST OCULAR SURGERY  PPV/SBP/SF6 OD 1/25/24  Pexy OD 5/21/24      RETINAL IMAGING:  OCT 08/13/24  OD -  focal SRF pockets decreased in size compared to prior, central outer atrophy/irregular stable/better   OS - retina normal, PVD    OCT RNFL 03/22/24  OD - abnl ST sector  OS - abnormal ST/IT/G, borderline T      ASSESSMENT & PLAN    # h/o RD repair OD   - mac off RRD repair PPV/SBP/SF6 1/25/24   - IOP OK 08/13/24      - RD far periphery inferior few clock hours just anterior to buckle Tx with pexy 5/21/24   - well contained   - recheck 6 months      # PVD OS   - Monitor      # Lattice OU   - Monitor   - Discussed RD precautions 02/23/24       # NS OD   - Myopic shift from cataract and buckle      # ONH drusen OS   - noted on FAF 1/22/24      # 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     Ambrosio Nuñez MD  PGY-3, Ophthalmology  [facility]     ATTESTATION     Attending Attestation:     Complete documentation of historical and exam elements from today's encounter can be found in the full encounter summary report (not reduplicated in this progress note).  I  personally obtained the chief complaint(s) and history of present illness.  I confirmed and edited as necessary the review of systems, past medical/surgical history, family history, social history, and examination findings as documented by others; and I examined the patient myself.  I personally reviewed the relevant tests, images, and reports as documented above.  I personally reviewed the ophthalmic test(s) associated with this encounter, agree with the interpretation(s) as documented by the resident/fellow, and have edited the corresponding report(s) as necessary.   I formulated and edited as necessary the assessment and plan and discussed the findings and management plan with the patient and family    Yue Jackson MD, PhD  , Vitreoretinal Surgery  Department of Ophthalmology  [facility]

## 2024-09-09 ENCOUNTER — TRANSFERRED RECORDS (OUTPATIENT)
Dept: HEALTH INFORMATION MANAGEMENT | Facility: CLINIC | Age: 74
End: 2024-09-09
Payer: COMMERCIAL

## 2024-09-16 ENCOUNTER — TELEPHONE (OUTPATIENT)
Dept: FAMILY MEDICINE | Facility: CLINIC | Age: 74
End: 2024-09-16
Payer: COMMERCIAL

## 2024-09-16 NOTE — TELEPHONE ENCOUNTER
MTM Recruitment: [facility]     Referral outreach attempt #2 on September 16, 2024 (pt call back)      Outcome: patient opted out    Suad Kenney CMA  MTM

## 2025-04-30 DIAGNOSIS — H40.002 GLAUCOMA SUSPECT, LEFT: ICD-10-CM

## 2025-05-01 RX ORDER — LATANOPROST 50 UG/ML
1 SOLUTION/ DROPS OPHTHALMIC AT BEDTIME
Qty: 2.5 ML | Refills: 2 | Status: SHIPPED | OUTPATIENT
Start: 2025-05-01

## 2025-05-01 NOTE — TELEPHONE ENCOUNTER
Last Written Prescription:  latanoprost (XALATAN) 0.005 % ophthalmic solution   2.5 mL 11 3/22/2024     ----------------------  Last Visit Date: 3-   ---Return in about 1 year (around 3/28/2026) for DFE OU, OCT 55 deree, OCT OU, Optos Photo.      Future Visit Date: none  ----------------------      Refill decision: Medication refilled per  Medication Refill in Ambulatory Care  policy.    Request from pharmacy:  Requested Prescriptions   Pending Prescriptions Disp Refills    latanoprost (XALATAN) 0.005 % ophthalmic solution [Pharmacy Med Name: LATANOPROST 0.005% EYE DROPS] 2.5 mL 11     Sig: PLACE 1 DROP INTO THE LEFT EYE AT BEDTIME.       There is no refill protocol information for this order

## 2025-05-08 ENCOUNTER — PATIENT OUTREACH (OUTPATIENT)
Dept: CARE COORDINATION | Facility: CLINIC | Age: 75
End: 2025-05-08
Payer: COMMERCIAL

## 2025-06-05 DIAGNOSIS — N52.9 ERECTILE DYSFUNCTION, UNSPECIFIED ERECTILE DYSFUNCTION TYPE: ICD-10-CM

## 2025-06-09 RX ORDER — SILDENAFIL 25 MG/1
25 TABLET, FILM COATED ORAL
Qty: 6 TABLET | Refills: 9 | Status: SHIPPED | OUTPATIENT
Start: 2025-06-09

## 2025-06-29 ENCOUNTER — HEALTH MAINTENANCE LETTER (OUTPATIENT)
Age: 75
End: 2025-06-29

## 2025-08-05 ENCOUNTER — TELEPHONE (OUTPATIENT)
Dept: FAMILY MEDICINE | Facility: CLINIC | Age: 75
End: 2025-08-05
Payer: COMMERCIAL

## (undated) DEVICE — SU VICRYL 6-0 S-24DA 12" J552G

## (undated) DEVICE — EYE NDL RETROBULBAR ATKINSON 25GA 1.5" 581637

## (undated) DEVICE — LINEN TOWEL PACK X5 5464

## (undated) DEVICE — SOL WATER IRRIG 500ML BOTTLE 2F7113

## (undated) DEVICE — EYE CANN SOFT TIP 25GA FOR VALVED SET 8065149530

## (undated) DEVICE — SU SILK 2-0 TIE 12X30" A305H

## (undated) DEVICE — GLOVE BIOGEL PI MICRO SZ 7.5 48575

## (undated) DEVICE — TAPE MICROPORE 1"X1.5YD 1530S-1

## (undated) DEVICE — SU PLAIN 6-0 TG140-8 18" 1735G

## (undated) DEVICE — EYE SHIELD PLASTIC

## (undated) DEVICE — ESU CORD BIPOLAR GREEN 10-4000

## (undated) DEVICE — EYE KIT AUTO GAS FOR CONSTELLATION 8065751014

## (undated) DEVICE — EYE PROBE ESU DIATHERMY DSP 25GA 339.21

## (undated) DEVICE — EYE DRSG PAD OVAL

## (undated) DEVICE — EYE PACK 25GA CONSTELLATION 10,000 CPM PPK9380-02

## (undated) DEVICE — PACK VITRECTOMY/RET CUSTOM ASC PQ15VRU12

## (undated) DEVICE — EYE PROBE LASER 25GA FLEX RFID 8065751114

## (undated) DEVICE — SU VICRYL 7-0 TG140-8DA 18" J546G

## (undated) DEVICE — EYE GAS ISPAN SF6 PER USE/CC/ML 8065797005

## (undated) DEVICE — SU ETHILON 5-0 RD-1DA 18" 749G

## (undated) DEVICE — EYE DRAPE MICROSCOPE UNIVERSAL (BIOM FULL) 08-MK55140

## (undated) RX ORDER — ACETAMINOPHEN 325 MG/1
TABLET ORAL
Status: DISPENSED
Start: 2024-01-25

## (undated) RX ORDER — PROPOFOL 10 MG/ML
INJECTION, EMULSION INTRAVENOUS
Status: DISPENSED
Start: 2024-01-25

## (undated) RX ORDER — HYDRALAZINE HYDROCHLORIDE 20 MG/ML
INJECTION INTRAMUSCULAR; INTRAVENOUS
Status: DISPENSED
Start: 2024-01-25